# Patient Record
Sex: MALE | Race: WHITE | NOT HISPANIC OR LATINO | Employment: FULL TIME | URBAN - METROPOLITAN AREA
[De-identification: names, ages, dates, MRNs, and addresses within clinical notes are randomized per-mention and may not be internally consistent; named-entity substitution may affect disease eponyms.]

---

## 2022-09-15 ENCOUNTER — OFFICE VISIT (OUTPATIENT)
Dept: URGENT CARE | Age: 57
End: 2022-09-15
Payer: COMMERCIAL

## 2022-09-15 DIAGNOSIS — R05.9 COUGH: Primary | ICD-10-CM

## 2022-09-15 DIAGNOSIS — U07.1 COVID: ICD-10-CM

## 2022-09-15 LAB
SARS-COV-2 AG UPPER RESP QL IA: POSITIVE
VALID CONTROL: ABNORMAL

## 2022-09-15 PROCEDURE — 87811 SARS-COV-2 COVID19 W/OPTIC: CPT

## 2022-09-15 PROCEDURE — 99213 OFFICE O/P EST LOW 20 MIN: CPT

## 2022-09-15 RX ORDER — BENZONATATE 200 MG/1
200 CAPSULE ORAL 3 TIMES DAILY PRN
Qty: 20 CAPSULE | Refills: 0 | Status: SHIPPED | OUTPATIENT
Start: 2022-09-15

## 2022-09-15 NOTE — LETTER
September 15, 2022     Patient: Yung Hickman   YOB: 1965   Date of Visit: 9/15/2022       To Whom it May Concern:    Yung Hickman was seen in my clinic on 9/15/2022  He may return to work on 9/20/2022       If you have any questions or concerns, please don't hesitate to call           Sincerely,          FELIPE Simons      CC: No Recipients

## 2022-09-19 NOTE — PROGRESS NOTES
[facility]        NAME: Gayle Oviedo is a 61 y o  male  : 1965    MRN: [id number]  DATE: 2022  TIME: 3:14 PM    Assessment and Plan   Cough [R05 9]  1  Cough  Poct Covid 19 Rapid Antigen Test    benzonatate (TESSALON) 200 MG capsule   2  COVID          Patient presents with complaints of cough, congestion , PND that started 2 days ago  Denies fevers  Assessment notes adenopathy, congestion, max sinus tenderness  Clear breath sounds  POCT COVID positive  Discussed Paxlovid  Patient chooses to discuss with PCP  Will order tessalon    Patient Instructions       Follow up with PCP as needed    Chief Complaint     Chief Complaint   Patient presents with    Cough     Cough, HA, congestion and sinus pain Sx since yesterday  Son has Terra  Pt took home test yesterday which was negative  Pt is vaccinated and booster x 2  History of Present Illness          Patient presents with complaints of cough, congestion , PND that started 2 days ago  Denies fevers  Assessment notes adenopathy, congestion, max sinus tenderness  Clear breath sounds  POCT COVID positive  Discussed Paxlovid  Patient chooses to discuss with PCP  Will order tessalon    Cough  Associated symptoms include headaches, postnasal drip and a sore throat  Pertinent negatives include no chest pain, chills, ear pain, fever, myalgias, rash or shortness of breath  Review of Systems   Review of Systems   Constitutional: Negative for chills, fatigue and fever  HENT: Positive for congestion, postnasal drip, sinus pressure and sore throat  Negative for ear discharge, ear pain and sinus pain  Eyes: Negative for pain and discharge  Respiratory: Positive for cough  Negative for shortness of breath  Cardiovascular: Negative for chest pain and palpitations  Gastrointestinal: Negative for abdominal pain, diarrhea, nausea and vomiting  Genitourinary: Negative for difficulty urinating and dysuria  Musculoskeletal: Negative for arthralgias and myalgias  Skin: Negative for rash  Neurological: Positive for headaches  Negative for dizziness, syncope, light-headedness and numbness  Psychiatric/Behavioral: Negative for agitation  All other systems reviewed and are negative  Current Medications       Current Outpatient Medications:     amLODIPine (NORVASC) 10 mg tablet, Take 1 tablet (10 mg total) by mouth daily, Disp: 90 tablet, Rfl: 3    benzonatate (TESSALON) 200 MG capsule, Take 1 capsule (200 mg total) by mouth 3 (three) times a day as needed for cough, Disp: 20 capsule, Rfl: 0    triamcinolone (KENALOG) 0 1 % lotion, Apply topically 3 (three) times a day, Disp: 60 mL, Rfl: 0    Current Allergies     Allergies as of 09/15/2022    (No Known Allergies)            The following portions of the patient's history were reviewed and updated as appropriate: allergies, current medications, past family history, past medical history, past social history, past surgical history and problem list      No past medical history on file  No past surgical history on file  No family history on file  Medications have been verified  Objective   There were no vitals taken for this visit  No LMP for male patient  Physical Exam     Physical Exam  Vitals reviewed  Constitutional:       General: He is not in acute distress  Appearance: Normal appearance  He is not ill-appearing  HENT:      Head: Normocephalic and atraumatic  Nose: Congestion and rhinorrhea present  Mouth/Throat:      Pharynx: No oropharyngeal exudate or posterior oropharyngeal erythema  Eyes:      Extraocular Movements: Extraocular movements intact  Conjunctiva/sclera: Conjunctivae normal    Cardiovascular:      Rate and Rhythm: Normal rate and regular rhythm  Pulmonary:      Effort: Pulmonary effort is normal  No respiratory distress  Musculoskeletal:         General: Normal range of motion  Lymphadenopathy:      Cervical: Cervical adenopathy present  Skin:     General: Skin is warm  Neurological:      General: No focal deficit present  Mental Status: He is alert     Psychiatric:         Mood and Affect: Mood normal          Behavior: Behavior normal          Judgment: Judgment normal

## 2023-06-07 ENCOUNTER — APPOINTMENT (OUTPATIENT)
Dept: RADIOLOGY | Age: 58
End: 2023-06-07
Payer: OTHER MISCELLANEOUS

## 2023-06-07 ENCOUNTER — OCCMED (OUTPATIENT)
Dept: URGENT CARE | Age: 58
End: 2023-06-07
Payer: OTHER MISCELLANEOUS

## 2023-06-07 DIAGNOSIS — M25.561 ACUTE PAIN OF RIGHT KNEE: ICD-10-CM

## 2023-06-07 DIAGNOSIS — Z02.6 ENCOUNTER RELATED TO WORKER'S COMPENSATION CLAIM: Primary | ICD-10-CM

## 2023-06-07 PROCEDURE — 99213 OFFICE O/P EST LOW 20 MIN: CPT

## 2023-06-07 PROCEDURE — 99283 EMERGENCY DEPT VISIT LOW MDM: CPT

## 2023-06-07 PROCEDURE — G0382 LEV 3 HOSP TYPE B ED VISIT: HCPCS

## 2023-06-07 PROCEDURE — 73564 X-RAY EXAM KNEE 4 OR MORE: CPT

## 2023-06-12 ENCOUNTER — APPOINTMENT (OUTPATIENT)
Dept: URGENT CARE | Age: 58
End: 2023-06-12

## 2023-06-30 ENCOUNTER — APPOINTMENT (OUTPATIENT)
Dept: URGENT CARE | Age: 58
End: 2023-06-30
Payer: OTHER MISCELLANEOUS

## 2023-06-30 PROCEDURE — 99213 OFFICE O/P EST LOW 20 MIN: CPT

## 2023-07-10 VITALS
BODY MASS INDEX: 38.15 KG/M2 | SYSTOLIC BLOOD PRESSURE: 134 MMHG | WEIGHT: 306.8 LBS | DIASTOLIC BLOOD PRESSURE: 87 MMHG | HEART RATE: 81 BPM | HEIGHT: 75 IN

## 2023-07-10 DIAGNOSIS — M25.561 PAIN AND SWELLING OF RIGHT KNEE: ICD-10-CM

## 2023-07-10 DIAGNOSIS — M25.461 PAIN AND SWELLING OF RIGHT KNEE: ICD-10-CM

## 2023-07-10 DIAGNOSIS — S86.911A KNEE STRAIN, RIGHT, INITIAL ENCOUNTER: Primary | ICD-10-CM

## 2023-07-10 PROCEDURE — 99203 OFFICE O/P NEW LOW 30 MIN: CPT | Performed by: ORTHOPAEDIC SURGERY

## 2023-07-10 NOTE — PROGRESS NOTES
Ortho Sports Medicine Knee Visit     Assesment:   right knee mild OA, possible medial or lateral meniscal tears-poor quality MRI    Plan:    Conservative treatment:    Ice to knee for 20 minutes at least 1-2 times daily. PT for ROM/strengthening to knee, hip and core. OTC NSAIDS prn for pain. Discussed patient could have aggravated underlying arthritis, strained knee. If his symptoms persist in 6 weeks then will get higher quality MRI to further evaluate as current MRI is of low quality. Work note light duty restrictions. Imaging: All imaging from today was reviewed by myself and explained to the patient. Injection:    No Injection planned at this time. May consider CSI at next appt     Surgery:     No surgery is recommended at this point, continue with conservative treatment plan as noted. History of Present Illness: The patient is a 61 y.o. male whose occupation is [employer] referred to me by [facility], seen in clinic for consultation of right knee pain. Pain is located posterior, lateral, medial.   The pain has been present for 6 weeks. The patient sustained an injury on 6/5/23. Twisting injury to knee after stepping on chain at work. He did have swelling after injury with acute pain medial and lateral aspect of knee. He is wearing compressive knee sleeve for support. His knee will feel like a noodle with ambulation. The pain is characterized as sharp, dull, achy. The pain is present daily. Pain is improved by rest, NSAIDS and bracing. Pain is aggravated by stairs, weight bearing, walking, standing and pivoting on a planted foot. Symptoms include clicking, cracking and swelling. The patient has tried rest, ice, NSAIDS and bracing. Knee Surgical History:  None    Past Medical, Social and Family History:  No past medical history on file. No past surgical history on file.   No Known Allergies  Current Outpatient Medications on File Prior to Visit Medication Sig Dispense Refill   • amLODIPine (NORVASC) 10 mg tablet TAKE 1 TABLET DAILY 90 tablet 3   • benzonatate (TESSALON) 200 MG capsule Take 1 capsule (200 mg total) by mouth 3 (three) times a day as needed for cough (Patient not taking: Reported on 7/10/2023) 20 capsule 0   • triamcinolone (KENALOG) 0.1 % lotion Apply topically 3 (three) times a day (Patient not taking: Reported on 7/10/2023) 60 mL 0     No current facility-administered medications on file prior to visit. Social History     Socioeconomic History   • Marital status: /Civil Union     Spouse name: Not on file   • Number of children: Not on file   • Years of education: Not on file   • Highest education level: Not on file   Occupational History   • Not on file   Tobacco Use   • Smoking status: Former   • Smokeless tobacco: Former   Substance and Sexual Activity   • Alcohol use: Not on file   • Drug use: Not on file   • Sexual activity: Not on file   Other Topics Concern   • Not on file   Social History Narrative   • Not on file     Social Determinants of Health     Financial Resource Strain: Not on file   Food Insecurity: Not on file   Transportation Needs: Not on file   Physical Activity: Not on file   Stress: Not on file   Social Connections: Not on file   Intimate Partner Violence: Not on file   Housing Stability: Not on file         I have reviewed the past medical, surgical, social and family history, medications and allergies as documented in the EMR. Review of systems: ROS is negative other than that noted in the HPI. Constitutional: Negative for fatigue and fever. HENT: Negative for sore throat. Respiratory: Negative for shortness of breath. Cardiovascular: Negative for chest pain. Gastrointestinal: Negative for abdominal pain. Endocrine: Negative for cold intolerance and heat intolerance. Genitourinary: Negative for flank pain. Musculoskeletal: Negative for back pain. Skin: Negative for rash. Allergic/Immunologic: Negative for immunocompromised state. Neurological: Negative for dizziness. Psychiatric/Behavioral: Negative for agitation. Physical Exam:    Blood pressure 134/87, pulse 81, height 6' 3" (1.905 m), weight (!) 139 kg (306 lb 12.8 oz). General/Constitutional: NAD, well developed, well nourished  HENT: Normocephalic, atraumatic  CV: Intact distal pulses, regular rate  Resp: No respiratory distress or labored breathing  Lymphatic: No lymphadenopathy palpated  Neuro: Alert and Oriented x 3, no focal deficits  Psych: Normal mood, normal affect, normal judgement, normal behavior  Skin: Warm, dry, no rashes, no erythema       Knee Exam (focused):                  RIGHT LEFT   ROM:   0-120 0-130   Palpation: Effusion mild negative     MJL tenderness Positive Negative     LJL tenderness Positive Negative   Instability: Varus stable stable     Valgus stable stable   Special Tests: Lachman Negative Negative     Posterior drawer Negative Negative     Anterior drawer Negative Negative     Pivot shift not tested not tested     Dial not tested not tested   Patella: Palpation Medial and lateral facet no tenderness     Mobility 1/4 1/4     Apprehension Negative Negative   Other: Single leg 1/4 squat not tested not tested      LE NV Exam: +2 DP/PT pulses bilaterally  Sensation intact to light touch L2-S1 bilaterally     Bilateral hip ROM demonstrates no pain actively or passively    No calf tenderness to palpation bilaterally    Knee Imaging    X-rays of the right knee were reviewed, which demonstrate mild OA with large osgood schlatters fragment tibial tubercle with osteophytes  . I have reviewed the radiology report and agree with their impression.     MRI of the right knee were reviewed, which demonstrate loculated bakers cyst, no meniscal tears, no ligamentous tears, synovitis, osgood schlatters with large tibial tubercle ossicle  I have reviewed the radiology report and agree with their impression.       Scribe Attestation    I,:  Marylee Lock am acting as a scribe while in the presence of the attending physician.:       I,:  Rosangela Herring, DO personally performed the services described in this documentation    as scribed in my presence.:

## 2023-07-10 NOTE — LETTER
July 10, 2023     Patient: Celeste Hodges  YOB: 1965  Date of Visit: 7/10/2023      To Whom it May Concern:    Celeste Hodges is under my professional care. Alison Kohler was seen in my office on 7/10/2023. Alison Kohler will continue light duty work until next evaluation in 6 weeks, start physical therapy. If you have any questions or concerns, please don't hesitate to call.          Sincerely,          Lucille Inman DO        CC: No Recipients

## 2023-07-10 NOTE — LETTER
July 10, 2023     Dot Dunne MD  [address] [facility]    Patient: Miquel Pompa   YOB: 1965   Date of Visit: 7/10/2023       Dear Dr. Boateng Postin: Thank you for referring Miquel Pompa to me for evaluation. Below are my notes for this consultation. If you have questions, please do not hesitate to call me. I look forward to following your patient along with you.          Sincerely,        Kai Martin,         CC: No Recipients

## 2023-07-24 ENCOUNTER — EVALUATION (OUTPATIENT)
Dept: PHYSICAL THERAPY | Facility: MEDICAL CENTER | Age: 58
End: 2023-07-24
Payer: OTHER MISCELLANEOUS

## 2023-07-24 DIAGNOSIS — M25.461 PAIN AND SWELLING OF RIGHT KNEE: Primary | ICD-10-CM

## 2023-07-24 DIAGNOSIS — M25.561 PAIN AND SWELLING OF RIGHT KNEE: Primary | ICD-10-CM

## 2023-07-24 DIAGNOSIS — S86.911A KNEE STRAIN, RIGHT, INITIAL ENCOUNTER: ICD-10-CM

## 2023-07-24 PROCEDURE — 97161 PT EVAL LOW COMPLEX 20 MIN: CPT | Performed by: PHYSICAL THERAPIST

## 2023-07-24 PROCEDURE — 97110 THERAPEUTIC EXERCISES: CPT | Performed by: PHYSICAL THERAPIST

## 2023-07-24 NOTE — PROGRESS NOTES
PT Evaluation     Today's date: 2023  Patient name: Dung Kent  : 1965  MRN: [id number]  Referring provider: Suzan Salinas DO  Dx:   Encounter Diagnosis     ICD-10-CM    1. Pain and swelling of right knee  M25.561 Ambulatory Referral to Physical Therapy    M25.461       2. Knee strain, right, initial encounter  S86.911A Ambulatory Referral to Physical Therapy                     Assessment  Assessment details: Mr. Joseph Bennett is a pleasant 61 y.o. 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 Patient's greatest concern is returning to work full duty. No further referral appears necessary at this time based upon examination findings. Patient presents with primary movement impairment diagnosis of nociceptive right knee pain secondary to a valgus/varus injury of the right knee limiting his tolerance to walking, squatting, standing, and completing activities that require him to bend the knee. Patient would benefit from outpatient physical therapy services to address the observed impairments to restore patient to pre-injury level of functioning. Impairments: abnormal gait, abnormal muscle firing, abnormal or restricted ROM, activity intolerance, impaired physical strength, lacks appropriate home exercise program and pain with function  Barriers to therapy: BMI  Understanding of Dx/Px/POC: good   Prognosis: good  Prognosis details: Motivated to improve    Goals  1. Patient will be independent in individualized HEP. 2. Patient will report decreased pain to at most 2/10 with activity. 3. Patient will achieve right knee AROM 0-130 degrees and pain free for ADL/IADL performance. 4. Patient will improve right knee/hip strength to 5/5 to assist with squatting and stair climbing. 5. Patient will be able to return to work without restriction. 6. Patient will achieve score on FOTO by [facility].      Plan  Plan details: Please contact me with any questions. Thank you for the referral.   Patient would benefit from: skilled physical therapy  Planned therapy interventions: home exercise program, therapeutic activities, therapeutic exercise, strengthening, functional ROM exercises, graded activity, graded exercise, kinesiology taping, neuromuscular re-education and patient education  Frequency: 1-2x/week. Duration in weeks: 6  Plan of Care beginning date: 2023  Plan of Care expiration date: 2023  Treatment plan discussed with: patient        Subjective Evaluation    History of Present Illness  Date of onset: 2023  Mechanism of injury: Mr. Vinicio Espinal is a 61 y.o. male who presents today with reports of right knee pain. Patient reports he was unloading equipment from a truck when he stepped on a chain and his knee twisted inward and then out to the side. He reports he did have an x-ray and MRI. He states there were no significant findings on imaging other than swelling noted on the MRI. Ortho recommended bilateral knee compression sleeves. He states the braces feel good and give him more support and mobility. Patient denies any previous trauma to the right knee. Patient reports swelling that has improved. Patient reports one episode of the knee buckling since the initial injury. Patient states he feels good when in his pool. Patient reports he is relieved that his imaging came back without any findings of tears. Patient is still working but is on modified, light duty restrictions. He follows up with ortho again in about 1 month. His goals are to be able to return to work without restriction. Patient Goals  Patient goals for therapy: return to work    Pain  Current pain ratin  At best pain ratin  At worst pain ratin  Pain location: medial side of right knee, lateral and posterior knee occasionally.   Quality: pulling  Relieving factors: ice (bracing, TENs)  Aggravating factors: walking, standing and stair climbing (bending the knee when dressing)  Progression: no change    Social Support  Lives in: [facility] house    Employment status: working (light duty (, unloading of equipment))  Treatments  No previous or current treatments        Objective     Observations     Right Knee   Positive for effusion. Additional Observation Details  Prominent anterior tibial tubercles bilaterally  Swelling right anterior knee around the inferior patella and anterior tibial tubercle    Functional squat:  Sitting towards left side    STS:  Difficulty without use of hands, sitting towards left side    Tenderness     Right Knee   Tenderness in the inferior fat pad, lateral joint line, LCL (distal), LCL (proximal), MCL (distal), MCL (proximal), medial joint line, patellar tendon and tibial tubercle. Active Range of Motion   Left Knee   Flexion: 130 degrees   Extension: 0 degrees     Right Knee   Flexion: 115 degrees   Extension: 0 degrees     Passive Range of Motion     Right Knee   Flexion: 115 degrees with pain    Mobility   Patellar Mobility:     Right Knee   WFL: medial, lateral, superior and inferior    Strength/Myotome Testing     Left Knee   Normal strength  Quadriceps contraction: good    Right Knee   Flexion: 4  Extension: 3+  Right knee quadriceps contraction strength: fair-good. Additional Strength Details  Hip strength:  Flexion L = 5/5, R = 4/5  Abduction L = 4/5, R = 4-/5  Adduction L = 5/5, R = 4/5      Tests     Right Knee   Positive medial Katharine. Negative lateral Katharine, valgus stress test at 0 degrees, valgus stress test at 30 degrees, varus stress test at 0 degrees and varus stress test at 30 degrees.      Ambulation   Weight-Bearing Status   Weight-Bearing Status (Right): full weight-bearing    Assistive device used: none    Observational Gait   Gait: antalgic       Flowsheet Rows    Flowsheet Row Most Recent Value   PT/OT G-Codes    Current Score 38   Projected Score 59   FOTO information reviewed Yes   Assessment Type Evaluation             Precautions: HTN (medically controlled), hx of osgood schlotter's    HEP: quad sets, SLR: flexion, heel slides  Manuals 7/24            KT R knee Med, lat, ant  CK                                                   Neuro Re-Ed             SLS with hip abd/ext nv            SLS foam nv                                                                             Ther Ex             Quad set 5"x10            SLR: flexion x10            Heel slides x10            SLR: abduction nv            SLR: adduction nv            Anterior step down nv            Lateral step down nv            SL RDL             Ther Activity                                       Gait Training                                       Modalities

## 2023-08-03 ENCOUNTER — OFFICE VISIT (OUTPATIENT)
Dept: PHYSICAL THERAPY | Facility: MEDICAL CENTER | Age: 58
End: 2023-08-03
Payer: OTHER MISCELLANEOUS

## 2023-08-03 DIAGNOSIS — M25.461 PAIN AND SWELLING OF RIGHT KNEE: ICD-10-CM

## 2023-08-03 DIAGNOSIS — M25.561 PAIN AND SWELLING OF RIGHT KNEE: ICD-10-CM

## 2023-08-03 DIAGNOSIS — S86.911A KNEE STRAIN, RIGHT, INITIAL ENCOUNTER: Primary | ICD-10-CM

## 2023-08-03 PROCEDURE — 97110 THERAPEUTIC EXERCISES: CPT | Performed by: PHYSICAL THERAPIST

## 2023-08-03 PROCEDURE — 97112 NEUROMUSCULAR REEDUCATION: CPT | Performed by: PHYSICAL THERAPIST

## 2023-08-03 NOTE — PROGRESS NOTES
Daily Note     Today's date: 8/3/2023  Patient name: Miquel Pompa  : 1965  MRN: [id number]  Referring provider: Kai Martin DO  Dx:   Encounter Diagnosis     ICD-10-CM    1. Knee strain, right, initial encounter  S86.911A       2. Pain and swelling of right knee  M25.561     M25.461                      Subjective: Patient reports decreased pain and swelling in the right knee. He reports compliance with HEP. He noted relief with KT and states it lasted for 2 days. Objective: See treatment diary below      Assessment: Patient presents with improved quad activation and improved knee ROM. He has right knee AROM 0-120 degrees. He demonstrates good quad control with SLR and was able to progress to SLR in various planes. Initiated SL balance with good tolerance. Slight increase in medial knee discomfort with SL balance into forward, lateral, and reverse taps. He did require cueing for valgus control and eccentric lowering that may have contributed to increased medial knee stress. Updated HEP in which patient demonstrated and verbalized understanding. Patient would benefit from continued PT for graded strengthening and stabilization to facilitate return to PLOF. Plan: Continue per plan of care. Progress treatment as tolerated.        Precautions: HTN (medically controlled), hx of osgood schlotter's    HEP: quad sets, SLR: flexion, heel slides, SL balance, SL balance + clock taps (no adduction  Manuals 7/24 8/3           KT R knee Med, lat, ant  CK CK                                                  Neuro Re-Ed             SLS with hip abd/ext nv            SLS foam nv 30"x3           SLS with forward, lateral, reverse taps  x10 rounds                                                               Ther Ex             Quad set 5"x10 5"x30           SLR: flexion x10 x30           Heel slides x10 x30           SLR: abduction nv x30           SLR: adduction nv x30           Anterior step down nv nv Lateral step down nv nv           SL RDL  nv           Ther Activity                                       Gait Training                                       Modalities

## 2023-08-10 ENCOUNTER — OFFICE VISIT (OUTPATIENT)
Dept: PHYSICAL THERAPY | Facility: MEDICAL CENTER | Age: 58
End: 2023-08-10
Payer: OTHER MISCELLANEOUS

## 2023-08-10 DIAGNOSIS — S86.911A KNEE STRAIN, RIGHT, INITIAL ENCOUNTER: Primary | ICD-10-CM

## 2023-08-10 DIAGNOSIS — M25.461 PAIN AND SWELLING OF RIGHT KNEE: ICD-10-CM

## 2023-08-10 DIAGNOSIS — M25.561 PAIN AND SWELLING OF RIGHT KNEE: ICD-10-CM

## 2023-08-10 PROCEDURE — 97110 THERAPEUTIC EXERCISES: CPT | Performed by: PHYSICAL THERAPIST

## 2023-08-10 PROCEDURE — 97112 NEUROMUSCULAR REEDUCATION: CPT | Performed by: PHYSICAL THERAPIST

## 2023-08-10 NOTE — PROGRESS NOTES
Daily Note     Today's date: 8/10/2023  Patient name: Christopher Cuadra  : 1965  MRN: [id number]  Referring provider: Paulino Allen DO  Dx:   Encounter Diagnosis     ICD-10-CM    1. Knee strain, right, initial encounter  S86.911A       2. Pain and swelling of right knee  M25.561     M25.461                      Subjective: Patient reports mild soreness at the medial knee after last visit. He states it did not limit him any more functionally. He reports only mild right medial knee pain. Objective: See treatment diary below      Assessment: Patient presents with improved quad activation and knee AROM WNL. He continues to be challenged with single limb stability with increased hip and trunk sway with reports of mild medial knee discomfort. Worked on straight plane step downs (forward and lateral) with cueing for valgus control without medial knee pain. Progressed posterior chain strengthening with external support for balance and cueing for hip hinge. Patient reported feeling good post treatment session. Progressed HEP to include standing balance with hip ext/abd to home program. Patient declined KT today. Patient will benefit from continued PT for graded strengthening and balance training to reduce right knee pain and facilitate return to PLOF. Plan: Continue per plan of care. Progress treatment as tolerated.        Precautions: HTN (medically controlled), hx of osgood schlotter's    HEP: quad sets, SLR: flexion, heel slides, SL balance, SL balance + clock taps (no adduction  Manuals 7/24 8/3 8/10          KT R knee Med, lat, ant  CK CK np                                                 Neuro Re-Ed             SLS with hip abd/ext nv  YTB x30          SLS foam nv 30"x3 30"x3          SLS with forward, lateral, reverse taps  x10 rounds x10 rounds 4"                                                              Ther Ex             bike   x10'          Quad set 5"x10 5"x30 5"x30          SLR: flexion x10 x30 1# x30          Heel slides x10 x30 np          SLR: abduction nv x30 1# x30          SLR: adduction nv x30 1# x30          Anterior step down nv nv 4" 3x10 B          Lateral step down nv nv 4" 3x10 B          SL RDL  nv 3x10 B          Ther Activity                                       Gait Training                                       Modalities

## 2023-08-15 ENCOUNTER — OFFICE VISIT (OUTPATIENT)
Dept: PHYSICAL THERAPY | Facility: MEDICAL CENTER | Age: 58
End: 2023-08-15
Payer: OTHER MISCELLANEOUS

## 2023-08-15 DIAGNOSIS — M25.461 PAIN AND SWELLING OF RIGHT KNEE: ICD-10-CM

## 2023-08-15 DIAGNOSIS — S86.911A KNEE STRAIN, RIGHT, INITIAL ENCOUNTER: Primary | ICD-10-CM

## 2023-08-15 DIAGNOSIS — M25.561 PAIN AND SWELLING OF RIGHT KNEE: ICD-10-CM

## 2023-08-15 PROCEDURE — 97110 THERAPEUTIC EXERCISES: CPT | Performed by: PHYSICAL THERAPIST

## 2023-08-15 PROCEDURE — 97112 NEUROMUSCULAR REEDUCATION: CPT | Performed by: PHYSICAL THERAPIST

## 2023-08-15 NOTE — PROGRESS NOTES
Daily Note     Today's date: 8/15/2023  Patient name: Eloisa Jacobo  : 1965  MRN: [id number]  Referring provider: Dareen Felty, DO  Dx:   Encounter Diagnosis     ICD-10-CM    1. Knee strain, right, initial encounter  S86.911A       2. Pain and swelling of right knee  M25.561     M25.461                      Subjective: Patient states he is having some increased right medial knee pain. He reports he may have overdone it with the exercises. He states from last visit however he felt like he was making steady improvement. Objective: See treatment diary below      Assessment: Patient presents with increased symptom irritability today. Improved with controlled AROM on bike. Patient fatigued with SLR today and required cueing to avoid quad lag as muscular fatigue settled in. Able to continue with CKC stabilization and strengthening as soreness that patient came in with worked itself out. Patient demonstrated improved balance and neuromuscular control with single limb activities. KT applied post treatment session for pain modulation. Discussed modifying exercises as needed if sore. Patient will benefit from continued PT for graded strengthening and balance training to reduce right knee pain and facilitate return to PLOF. Plan: Continue per plan of care. Progress treatment as tolerated. progress pending follow up with ortho .       Precautions: HTN (medically controlled), hx of osgood schlotter's    HEP: quad sets, SLR: flexion, heel slides, SL balance, SL balance + clock taps (no adduction  Manuals 7/24 8/3 8/10 8/15         KT R knee Med, lat, ant  CK CK np CK                                                Neuro Re-Ed             SLS with hip abd/ext nv  YTB x30 nv         SLS foam nv 30"x3 30"x3 30"x3         SLS with forward, lateral, reverse taps  x10 rounds x10 rounds 4" 4" x10 rounds                                                             Ther Ex             bike   x10' x10' Quad set 5"x10 5"x30 5"x30 5x30         SLR: flexion x10 x30 1# x30 1# x30         Heel slides x10 x30 np          SLR: abduction nv x30 1# x30 1# x30         SLR: adduction nv x30 1# x30 1# x30         Anterior step down nv nv 4" 3x10 B 4" 3x10 B         Lateral step down nv nv 4" 3x10 B 4" 3x10         SL RDL  nv 3x10 B 3x10 B         Ther Activity                                       Gait Training                                       Modalities

## 2023-08-23 ENCOUNTER — OFFICE VISIT (OUTPATIENT)
Dept: OBGYN CLINIC | Facility: MEDICAL CENTER | Age: 58
End: 2023-08-23
Payer: OTHER MISCELLANEOUS

## 2023-08-23 ENCOUNTER — TELEPHONE (OUTPATIENT)
Age: 58
End: 2023-08-23

## 2023-08-23 VITALS
HEART RATE: 83 BPM | SYSTOLIC BLOOD PRESSURE: 131 MMHG | WEIGHT: 305.2 LBS | BODY MASS INDEX: 37.95 KG/M2 | HEIGHT: 75 IN | DIASTOLIC BLOOD PRESSURE: 83 MMHG

## 2023-08-23 DIAGNOSIS — M23.91 INTERNAL DERANGEMENT OF KNEE JOINT, RIGHT: Primary | ICD-10-CM

## 2023-08-23 PROCEDURE — 99213 OFFICE O/P EST LOW 20 MIN: CPT | Performed by: ORTHOPAEDIC SURGERY

## 2023-08-23 RX ORDER — IBUPROFEN 100 MG/1
100 TABLET, CHEWABLE ORAL EVERY 8 HOURS PRN
COMMUNITY

## 2023-08-23 NOTE — TELEPHONE ENCOUNTER
Caller: Tosin Harry     Doctor: Samantha Khoury    Reason for call: His work does not have an incoming fax number, Can you place note into his mychart? He is currently settting up his account online.     Call back#: [phone]

## 2023-08-23 NOTE — PROGRESS NOTES
Ortho Sports Medicine Knee Follow Up Visit     Assesment:     61 y.o. male right knee mild OA with possible medial meniscal tear-poor quality MRI received previously     Plan:    Conservative treatment:    Ice to knee for 20 minutes at least 1-2 times daily. PT for ROM/strengthening to knee, hip and core. OTC NSAIDS prn for pain. Imaging: All imaging from today was reviewed by myself and explained to the patient. We will obtain an MRI of the knee to rule out medial mensicus tear . Follow up in 1-2 weeks for MRI review. Will make a definitive treatment plan based on the results of the MRI. Injection:    No Injection planned at this time. Surgery:     No surgery is recommended at this point, continue with conservative treatment plan as noted. Follow up:    Return for 1 week after MRI to review results . Chief Complaint   Patient presents with   • Right Knee - Follow-up       History of Present Illness: The patient is returns for follow up of Right knee pain. Since the prior visit, He reports no improvement. Patient has been attending physical therapy for his right knee since his last appointment. Patient notes very minimal improvement with this. Patient states that he continues to have pain about the medial aspect of the knee. Patient reports that the pain is constant stating it is minimal at rest but becomes worse with weightbearing activities and then increasingly worse if he were to do any twisting pivoting type motions. Patient has been using his knee sleeve to provide him with some stability. Patient does report that with lateral movements or any twisting motion he does feel as though the knee is unstable and is going to give out and buckle. Patient denies any new injuries or trauma since last appointment. Patient does notice nonpainful clicking and catching within the knee since her last appointment.     Pain is located medial.     Pain is improved by rest.  Pain is aggravated by stairs, squatting, weight bearing, walking, standing and pivoting on a planted foot. Symptoms include clicking, catching, swelling and instability. The patient has tried rest, ice, NSAIDS, physical therapy and bracing. Knee Surgical History:  None    Past Medical, Social and Family History:  History reviewed. No pertinent past medical history. History reviewed. No pertinent surgical history. No Known Allergies  Current Outpatient Medications on File Prior to Visit   Medication Sig Dispense Refill   • amLODIPine (NORVASC) 10 mg tablet TAKE 1 TABLET DAILY 90 tablet 3   • ibuprofen (ADVIL,MOTRIN) 100 MG chewable tablet Chew 100 mg every 8 (eight) hours as needed for mild pain     • benzonatate (TESSALON) 200 MG capsule Take 1 capsule (200 mg total) by mouth 3 (three) times a day as needed for cough (Patient not taking: Reported on 7/10/2023) 20 capsule 0   • triamcinolone (KENALOG) 0.1 % lotion Apply topically 3 (three) times a day (Patient not taking: Reported on 7/10/2023) 60 mL 0     No current facility-administered medications on file prior to visit.      Social History     Socioeconomic History   • Marital status: /Civil Union     Spouse name: Not on file   • Number of children: Not on file   • Years of education: Not on file   • Highest education level: Not on file   Occupational History   • Not on file   Tobacco Use   • Smoking status: Former   • Smokeless tobacco: Former   Substance and Sexual Activity   • Alcohol use: Not on file   • Drug use: Not on file   • Sexual activity: Not on file   Other Topics Concern   • Not on file   Social History Narrative   • Not on file     Social Determinants of Health     Financial Resource Strain: Not on file   Food Insecurity: Not on file   Transportation Needs: Not on file   Physical Activity: Not on file   Stress: Not on file   Social Connections: Not on file   Intimate Partner Violence: Not on file   Housing Stability: Not on file I have reviewed the past medical, surgical, social and family history, medications and allergies as documented in the EMR. Review of systems: ROS is negative other than that noted in the HPI. Constitutional: Negative for fatigue and fever. Physical Exam:    Blood pressure 131/83, pulse 83, height 6' 3" (1.905 m), weight (!) 138 kg (305 lb 3.2 oz). General/Constitutional: NAD, well developed, well nourished  HENT: Normocephalic, atraumatic  CV: Intact distal pulses, regular rate  Resp: No respiratory distress or labored breathing  GI: Soft and non-tender   Lymphatic: No lymphadenopathy palpated  Neuro: Alert and Oriented x 3, no focal deficits  Psych: Normal mood, normal affect, normal judgement, normal behavior  Skin: Warm, dry, no rashes, no erythema      Knee Exam (focused): RIGHT LEFT   ROM:   0-130, pain medially with hyperflexion 0-130   Palpation: Effusion minimal negative     MJL tenderness Positive Negative     LJL tenderness Negative Negative   Meniscus:  Katharine Positive Negative    Apley's Compression Positive Negative   Instability: Varus stable stable     Valgus Stable, pain medially with stress stable   Special Tests: Lachman Negative Negative     Posterior drawer Negative Negative     Anterior drawer Negative Negative     Pivot shift not tested not tested     Dial not tested not tested   Patella: Palpation no tenderness no tenderness     Mobility 1/4 1/4     Apprehension Negative Negative   Other: Single leg 1/4 squat not tested not tested           LE NV Exam: +2 DP/PT pulses bilaterally  Sensation intact to light touch L2-S1 bilaterally    No calf tenderness to palpation bilaterally      Knee Imaging    No imaging was performed today

## 2023-08-23 NOTE — TELEPHONE ENCOUNTER
Caller: patient    Doctor: Gilda Dasilva    Reason for call: patient requesting letter for work to be faxed.  Will cb with fax #    Call back#: [phone]

## 2023-08-24 ENCOUNTER — TELEPHONE (OUTPATIENT)
Dept: OBGYN CLINIC | Facility: MEDICAL CENTER | Age: 58
End: 2023-08-24

## 2023-08-24 ENCOUNTER — OFFICE VISIT (OUTPATIENT)
Dept: PHYSICAL THERAPY | Facility: MEDICAL CENTER | Age: 58
End: 2023-08-24
Payer: OTHER MISCELLANEOUS

## 2023-08-24 DIAGNOSIS — S86.911A KNEE STRAIN, RIGHT, INITIAL ENCOUNTER: Primary | ICD-10-CM

## 2023-08-24 DIAGNOSIS — M25.561 PAIN AND SWELLING OF RIGHT KNEE: ICD-10-CM

## 2023-08-24 DIAGNOSIS — M25.461 PAIN AND SWELLING OF RIGHT KNEE: ICD-10-CM

## 2023-08-24 PROCEDURE — 97110 THERAPEUTIC EXERCISES: CPT | Performed by: PHYSICAL THERAPIST

## 2023-08-24 PROCEDURE — 97112 NEUROMUSCULAR REEDUCATION: CPT | Performed by: PHYSICAL THERAPIST

## 2023-08-24 PROCEDURE — 97140 MANUAL THERAPY 1/> REGIONS: CPT | Performed by: PHYSICAL THERAPIST

## 2023-08-24 NOTE — TELEPHONE ENCOUNTER
Anaya Bowers was in for an appt with you yesterday and didn't think he needed a letter but workers comp is requesting one now.   Can you please do a letter for him and I will email it to him

## 2023-08-24 NOTE — PROGRESS NOTES
Daily Note     Today's date: 2023  Patient name: Guero Rondon  : 1965  MRN: [id number]  Referring provider: Orly Schumacher DO  Dx:   Encounter Diagnosis     ICD-10-CM    1. Knee strain, right, initial encounter  S86.911A       2. Pain and swelling of right knee  M25.561     M25.461                      Subjective: Patient followed up with Dr. Denisse Nesbitt. He is being sent for a second MRI as his initial MRI was unclear. His MRI is scheduled for . Patient states he is still experiencing anterior medial knee pain. He states the lateral and posterior pain has resolved. Objective: See treatment diary below      Assessment: Patient continues to have discomfort medially and anteriorly at the left knee. Improved with medial knee and patellar ROM and mobilization. Decreased pain reported with weightbearing and ambulation following. Demonstrates good quad set and improved stability and control at the left knee with SL CKC strengthening. Patient will benefit from continued PT for graded strengthening and balance training to reduce right knee pain and facilitate return to PLOF. Plan: Continue per plan of care. Progress treatment as tolerated. progress pending follow up with ortho .       Precautions: HTN (medically controlled), hx of osgood schlotter's    HEP: quad sets, SLR: flexion, abduction, adduction, heel slides, SL balance, SL balance + clock taps (no adduction)  Manuals  8/3 8/10 8/15 8/24        KT R knee Med, lat, ant  CK CK np CK         STM medial knee, inferior patella, patellar ROM     x8'                                  Neuro Re-Ed             SLS with hip abd/ext nv  YTB x30 nv YTB x30        SLS foam nv 30"x3 30"x3 30"x3 30" x3        SLS with forward, lateral, reverse taps  x10 rounds x10 rounds 4" 4" x10 rounds cones x10 rounds                                                            Ther Ex             bike   x10' x10' x10'        Quad set 5"x10 5"x30 5"x30 5x30 5"x30        SLR: flexion x10 x30 1# x30 1# x30 1# x30        Heel slides x10 x30 np  x30        SLR: abduction nv x30 1# x30 1# x30 1# x30        SLR: adduction nv x30 1# x30 1# x30 1# x30        Anterior step down nv nv 4" 3x10 B 4" 3x10 B 4" 3x10         Lateral step down nv nv 4" 3x10 B 4" 3x10 4" 3x10        SL RDL  nv 3x10 B 3x10 B 3x10         Ther Activity                                       Gait Training                                       Modalities

## 2023-08-31 ENCOUNTER — OFFICE VISIT (OUTPATIENT)
Dept: PHYSICAL THERAPY | Facility: MEDICAL CENTER | Age: 58
End: 2023-08-31
Payer: OTHER MISCELLANEOUS

## 2023-08-31 DIAGNOSIS — M25.561 PAIN AND SWELLING OF RIGHT KNEE: ICD-10-CM

## 2023-08-31 DIAGNOSIS — M25.461 PAIN AND SWELLING OF RIGHT KNEE: ICD-10-CM

## 2023-08-31 DIAGNOSIS — S86.911A KNEE STRAIN, RIGHT, INITIAL ENCOUNTER: Primary | ICD-10-CM

## 2023-08-31 PROCEDURE — 97110 THERAPEUTIC EXERCISES: CPT | Performed by: PHYSICAL THERAPIST

## 2023-08-31 PROCEDURE — 97140 MANUAL THERAPY 1/> REGIONS: CPT | Performed by: PHYSICAL THERAPIST

## 2023-08-31 PROCEDURE — 97112 NEUROMUSCULAR REEDUCATION: CPT | Performed by: PHYSICAL THERAPIST

## 2023-08-31 NOTE — PROGRESS NOTES
Daily Note     Today's date: 2023  Patient name: Steven Nash  : 1965  MRN: [id number]  Referring provider: Lidia Ascencio DO  Dx:   Encounter Diagnosis     ICD-10-CM    1. Knee strain, right, initial encounter  S86.911A       2. Pain and swelling of right knee  M25.561     M25.461                      Subjective: Patient states he continues to have anterior medial knee pain but states it is not worse. Objective: See treatment diary below      Assessment: Patient is ambulating better and tolerates functional strengthening well. He does unfortunately still experience anterior knee pain, which I do believe is patellofemoral in origin. Anterior step down was transitioned to decline board to increase load on patellar tendon. Patient demonstrates good understanding of all interventions with good valgus control at the knee. Patient reported quad and calf muscle soreness post treatment session. Patient will benefit from continued PT for graded strengthening and balance training to reduce right knee pain and facilitate return to PLOF. Plan: Continue per plan of care. Progress treatment as tolerated. progress pending follow up with ortho .       Precautions: HTN (medically controlled), hx of osgood schlotter's    HEP: quad sets, SLR: flexion, abduction, adduction, heel slides, SL balance, SL balance + clock taps (no adduction)  Manuals 7/24 8/3 8/10 8/15 8/24 8/31       KT R knee Med, lat, ant  CK CK np CK         STM medial knee, inferior patella, patellar ROM     x8' x8'                                 Neuro Re-Ed             SLS with hip abd/ext nv  YTB x30 nv YTB x30 YTB x30       SLS foam nv 30"x3 30"x3 30"x3 30" x3 30"x3       SLS with forward, lateral, reverse taps  x10 rounds x10 rounds 4" 4" x10 rounds cones x10 rounds cones x10 rounds                                                           Ther Ex             bike   x10' x10' x10' x10'       Quad set 5"x10 5"x30 5"x30 5x30 5"x30 5"x30       SLR: flexion x10 x30 1# x30 1# x30 1# x30 2# x30       Heel slides x10 x30 np  x30        SLR: abduction nv x30 1# x30 1# x30 1# x30 2# x30       SLR: adduction nv x30 1# x30 1# x30 1# x30 2# x30       Anterior step down nv nv 4" 3x10 B 4" 3x10 B 4" 3x10  decline board 3x10       Lateral step down nv nv 4" 3x10 B 4" 3x10 4" 3x10 6" 3x10       SL RDL  nv 3x10 B 3x10 B 3x10  3x10       Ther Activity                                       Gait Training                                       Modalities

## 2023-09-01 ENCOUNTER — HOSPITAL ENCOUNTER (OUTPATIENT)
Facility: MEDICAL CENTER | Age: 58
Discharge: HOME | End: 2023-09-01
Payer: COMMERCIAL

## 2023-09-01 DIAGNOSIS — M23.91 INTERNAL DERANGEMENT OF KNEE JOINT, RIGHT: ICD-10-CM

## 2023-09-01 PROCEDURE — 73721 MRI JNT OF LWR EXTRE W/O DYE: CPT

## 2023-09-01 PROCEDURE — G1004 CDSM NDSC: HCPCS

## 2023-09-06 ENCOUNTER — TELEPHONE (OUTPATIENT)
Age: 58
End: 2023-09-06

## 2023-09-06 ENCOUNTER — OFFICE VISIT (OUTPATIENT)
Dept: PHYSICAL THERAPY | Facility: MEDICAL CENTER | Age: 58
End: 2023-09-06
Payer: OTHER MISCELLANEOUS

## 2023-09-06 DIAGNOSIS — M25.461 PAIN AND SWELLING OF RIGHT KNEE: ICD-10-CM

## 2023-09-06 DIAGNOSIS — S86.911A KNEE STRAIN, RIGHT, INITIAL ENCOUNTER: Primary | ICD-10-CM

## 2023-09-06 DIAGNOSIS — M25.561 PAIN AND SWELLING OF RIGHT KNEE: ICD-10-CM

## 2023-09-06 PROCEDURE — 97112 NEUROMUSCULAR REEDUCATION: CPT | Performed by: PHYSICAL THERAPIST

## 2023-09-06 PROCEDURE — 97110 THERAPEUTIC EXERCISES: CPT | Performed by: PHYSICAL THERAPIST

## 2023-09-06 NOTE — TELEPHONE ENCOUNTER
Doctor: Kary Lozano Name: [facility]  CB#:     [facility] called to speak to clinical team regarding MRI ordered by provider.

## 2023-09-06 NOTE — TELEPHONE ENCOUNTER
Left message to please call the office. When Pt returns call please state providers message and schedule appointment with Dr. Gayathri Beckham.

## 2023-09-06 NOTE — PROGRESS NOTES
Daily Note     Today's date: 2023  Patient name: Angela Reynolds  : 1965  MRN: [id number]  Referring provider: Twila Verdugo DO  Dx:   Encounter Diagnosis     ICD-10-CM    1. Knee strain, right, initial encounter  S86.911A       2. Pain and swelling of right knee  M25.561     M25.461                      Subjective: Patient follows up with ortho on Friday to review his MRI results. He states he feels his right knee is slowly feeling better. He denies any symptom provocation with physical therapy interventions but does admit to muscular soreness. Objective: See treatment diary below      Assessment: Patient is ambulating better and tolerates functional strengthening well. Patient is responding well to current treatment plan. He demonstrates improved motor control and knee proprioception and has been able to progress balance interventions to increase challenge. Patient denied any symptom provocation during or post treatment session. Patient will follow up after ortho appointment to discuss POC going forward. Plan: Pending ortho visit.       Precautions: HTN (medically controlled), hx of osgood schlotter's    HEP: quad sets, SLR: flexion, abduction, adduction, heel slides, SL balance, SL balance + clock taps (no adduction)  Manuals 7/24 8/3 8/10 8/15 8/24 8/31 9/6      KT R knee Med, lat, ant  CK CK np CK         STM medial knee, inferior patella, patellar ROM     x8' x8'                                 Neuro Re-Ed             SLS with hip abd/ext nv  YTB x30 nv YTB x30 YTB x30 YTB x30      SLS foam nv 30"x3 30"x3 30"x3 30" x3 30"x3 ABC's x3      SLS with forward, lateral, reverse taps  x10 rounds x10 rounds 4" 4" x10 rounds cones x10 rounds cones x10 rounds cones x10 rounds                                                          Ther Ex             bike   x10' x10' x10' x10' x10'      Quad set 5"x10 5"x30 5"x30 5x30 5"x30 5"x30 5'x30      SLR: flexion x10 x30 1# x30 1# x30 1# x30 2# x30 2# x30      Heel slides x10 x30 np  x30        SLR: abduction nv x30 1# x30 1# x30 1# x30 2# x30 2# 3x30      SLR: adduction nv x30 1# x30 1# x30 1# x30 2# x30 2# x30      Anterior step down nv nv 4" 3x10 B 4" 3x10 B 4" 3x10  decline board 3x10 decline board 3x10      Lateral step down nv nv 4" 3x10 B 4" 3x10 4" 3x10 6" 3x10 6" 3x10      SL RDL  nv 3x10 B 3x10 B 3x10  3x10 3x10      Ther Activity                                       Gait Training                                       Modalities

## 2023-09-07 NOTE — TELEPHONE ENCOUNTER
Caller: Walter Plummer     Doctor: Dr Nino Kothari     Reason for call: Returning your call     Call back#: [phone] Please call this number.

## 2023-09-08 ENCOUNTER — OFFICE VISIT (OUTPATIENT)
Dept: OBGYN CLINIC | Facility: MEDICAL CENTER | Age: 58
End: 2023-09-08
Payer: OTHER MISCELLANEOUS

## 2023-09-08 VITALS
BODY MASS INDEX: 38.05 KG/M2 | HEIGHT: 75 IN | SYSTOLIC BLOOD PRESSURE: 134 MMHG | DIASTOLIC BLOOD PRESSURE: 86 MMHG | HEART RATE: 83 BPM | WEIGHT: 306 LBS

## 2023-09-08 DIAGNOSIS — R22.41 MASS OF RIGHT KNEE: Primary | ICD-10-CM

## 2023-09-08 PROCEDURE — 99213 OFFICE O/P EST LOW 20 MIN: CPT | Performed by: ORTHOPAEDIC SURGERY

## 2023-09-08 NOTE — PROGRESS NOTES
Ortho Sports Medicine Knee Follow Up Visit     Assesment:     61 y.o. 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 9x1.6CM, differential considerations localized nodular synovitis, synovial sarcoma. Plan:    Conservative treatment:    Ice to knee for 20 minutes at least 1-2 times daily. OTC NSAIDS prn for pain. Patient will be referred to Dr Jared Perez to see if this synovial tumor is related to pain medial aspect of knee and further evaluate to make sure mass is not concerning. MRI shows no medial meniscal tear or ligamentous tears. Maintain HEP as shown by therapy. Imaging: All imaging from today was reviewed by myself and explained to the patient. Injection:    No Injection planned at this time. Surgery:     No surgery is recommended at this point, continue with conservative treatment plan as noted. Follow up:    Return for Dr Jared Perez . Chief Complaint   Patient presents with   • Right Knee - Follow-up       History of Present Illness: The patient is returns for follow up of Right knee pain. He is here to review updated MRI of right knee. Since the prior visit, He reports no improvement. Patient has been attending physical therapy for his right knee since his last appointment. Patient notes very minimal improvement with this. Patient states that he continues to have pain about the medial aspect of the knee. Patient reports that the pain is constant stating it is minimal at rest but becomes worse with weightbearing activities and then increasingly worse if he were to do any twisting pivoting type motions. Patient has been using his knee sleeve to provide him with some stability. Patient does report that with lateral movements or any twisting motion he does feel as though the knee is unstable and is going to give out and buckle.   Patient denies any new injuries or trauma since last appointment. Patient does notice nonpainful clicking and catching within the knee since her last appointment. Pain is improved by rest.  Pain is aggravated by stairs, squatting, weight bearing, walking, standing and pivoting on a planted foot. Symptoms include clicking, catching, swelling and instability. The patient has tried rest, ice, NSAIDS, physical therapy and bracing. Knee Surgical History:  None    Past Medical, Social and Family History:  No past medical history on file. No past surgical history on file. No Known Allergies  Current Outpatient Medications on File Prior to Visit   Medication Sig Dispense Refill   • amLODIPine (NORVASC) 10 mg tablet TAKE 1 TABLET DAILY 90 tablet 3   • ibuprofen (ADVIL,MOTRIN) 100 MG chewable tablet Chew 100 mg every 8 (eight) hours as needed for mild pain     • benzonatate (TESSALON) 200 MG capsule Take 1 capsule (200 mg total) by mouth 3 (three) times a day as needed for cough (Patient not taking: Reported on 7/10/2023) 20 capsule 0   • triamcinolone (KENALOG) 0.1 % lotion Apply topically 3 (three) times a day (Patient not taking: Reported on 7/10/2023) 60 mL 0     No current facility-administered medications on file prior to visit.      Social History     Socioeconomic History   • Marital status: /Civil Union     Spouse name: Not on file   • Number of children: Not on file   • Years of education: Not on file   • Highest education level: Not on file   Occupational History   • Not on file   Tobacco Use   • Smoking status: Former   • Smokeless tobacco: Former   Substance and Sexual Activity   • Alcohol use: Not on file   • Drug use: Not on file   • Sexual activity: Not on file   Other Topics Concern   • Not on file   Social History Narrative   • Not on file     Social Determinants of Health     Financial Resource Strain: Not on file   Food Insecurity: Not on file   Transportation Needs: Not on file   Physical Activity: Not on file   Stress: Not on file   Social Connections: Not on file   Intimate Partner Violence: Not on file   Housing Stability: Not on file         I have reviewed the past medical, surgical, social and family history, medications and allergies as documented in the EMR. Review of systems: ROS is negative other than that noted in the HPI. Constitutional: Negative for fatigue and fever. Physical Exam:    Blood pressure 134/86, pulse 83, height 6' 3" (1.905 m), weight (!) 139 kg (306 lb). General/Constitutional: NAD, well developed, well nourished  HENT: Normocephalic, atraumatic  CV: Intact distal pulses, regular rate  Resp: No respiratory distress or labored breathing  GI: Soft and non-tender   Lymphatic: No lymphadenopathy palpated  Neuro: Alert and Oriented x 3, no focal deficits  Psych: Normal mood, normal affect, normal judgement, normal behavior  Skin: Warm, dry, no rashes, no erythema      Knee Exam (focused): RIGHT LEFT   ROM:   0-130, pain medially with hyperflexion 0-130   Palpation: Effusion minimal negative     MJL tenderness Positive Negative     LJL tenderness Negative Negative   Meniscus:  Katharine Positive Negative    Apley's Compression Positive Negative   Instability: Varus stable stable     Valgus Stable, pain medially with stress stable   Special Tests: Lachman Negative Negative     Posterior drawer Negative Negative     Anterior drawer Negative Negative     Pivot shift not tested not tested     Dial not tested not tested   Patella: Palpation no tenderness no tenderness     Mobility 1/4 1/4     Apprehension Negative Negative   Other: Single leg 1/4 squat not tested not tested           LE NV Exam: +2 DP/PT pulses bilaterally  Sensation intact to light touch L2-S1 bilaterally    No calf tenderness to palpation bilaterally      Knee Imaging    MRI right knee no meniscal or ligamentous tears, trace effusion, lobulated intra articular mass posterior intercondylar notch, posterior and medial to PCL measuring 3.4x1. 9x1.6CM, differential considerations localized nodular synovitis, synovial sarcoma.      Scribe Attestation    I,:  Stuart Davis am acting as a scribe while in the presence of the attending physician.:       I,:  Josué Ramos, DO personally performed the services described in this documentation    as scribed in my presence.:

## 2023-09-08 NOTE — LETTER
September 8, 2023     Patient: Matias Gomes  YOB: 1965  Date of Visit: 9/8/2023      To Whom it May Concern:    Matias Gomes is under my professional care. Nkihil Priest was seen in my office on 9/8/2023. Nikhil Priest will continue with light duty restrictions. He will see Dr Jared Perez 9/14/23 to further evaluate right knee. If you have any questions or concerns, please don't hesitate to call.          Sincerely,          Dmitriy Osuna,         CC: No Recipients

## 2023-09-14 ENCOUNTER — OFFICE VISIT (OUTPATIENT)
Dept: OBGYN CLINIC | Facility: MEDICAL CENTER | Age: 58
End: 2023-09-14
Payer: OTHER MISCELLANEOUS

## 2023-09-14 VITALS
DIASTOLIC BLOOD PRESSURE: 86 MMHG | WEIGHT: 304.6 LBS | HEIGHT: 75 IN | HEART RATE: 84 BPM | BODY MASS INDEX: 37.87 KG/M2 | SYSTOLIC BLOOD PRESSURE: 123 MMHG

## 2023-09-14 DIAGNOSIS — R22.41 MASS OF RIGHT KNEE: Primary | ICD-10-CM

## 2023-09-14 DIAGNOSIS — D48.1 NEOPLASM OF UNCERTAIN BEHAVIOR OF CONNECTIVE AND OTHER SOFT TISSUE: ICD-10-CM

## 2023-09-14 PROBLEM — M25.519 SHOULDER JOINT PAIN: Status: ACTIVE | Noted: 2023-09-14

## 2023-09-14 PROBLEM — I10 HYPERTENSION: Status: ACTIVE | Noted: 2023-09-14

## 2023-09-14 PROCEDURE — 99245 OFF/OP CONSLTJ NEW/EST HI 55: CPT | Performed by: STUDENT IN AN ORGANIZED HEALTH CARE EDUCATION/TRAINING PROGRAM

## 2023-09-14 RX ORDER — GABAPENTIN 300 MG/1
300 CAPSULE ORAL ONCE
OUTPATIENT
Start: 2023-09-14 | End: 2023-09-14

## 2023-09-14 RX ORDER — ACETAMINOPHEN 325 MG/1
975 TABLET ORAL ONCE
OUTPATIENT
Start: 2023-09-14 | End: 2023-09-14

## 2023-09-14 RX ORDER — TRANEXAMIC ACID 10 MG/ML
1000 INJECTION, SOLUTION INTRAVENOUS ONCE
OUTPATIENT
Start: 2023-09-14 | End: 2023-09-14

## 2023-09-14 RX ORDER — CHLORHEXIDINE GLUCONATE 4 G/100ML
SOLUTION TOPICAL DAILY PRN
OUTPATIENT
Start: 2023-09-14

## 2023-09-14 NOTE — LETTER
September 14, 2023     Patient: Danial Calixto  YOB: 1965  Date of Visit: 9/14/2023      To Whom it May Concern:    Danial Calixto is under my professional care. Demetrio Parsons was seen in my office on 9/14/2023. Demetrio Parsons will continue with light duty restrictions. If you have any questions or concerns, please don't hesitate to call.          Sincerely,      Lucie Likes, DO        CC: No Recipients

## 2023-09-14 NOTE — H&P (VIEW-ONLY)
Orthopedic Surgery Office Note  Walker Sheikh (89 y.o. male)  : 1965 Encounter Date: 2023  Dr. Collin Alvarenga DO, Orthopedic Surgeon  Orthopedic Oncology & Sarcoma Surgery   Phone:[phone] YJD:[phone]    Assessment and Plan: Walker Sheikh is a 61 y.o. male with:    1. 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 Right knee pain   - due to injury and history of degenerative changes, may have chronic knee pain following removal of lesion     3. Comorbidity, including: HTN  - continue current management     Procedure:  No procedures performed    Surgical Planning:   The patient is indicated for surgical intervention with excision of right knee mass, with dissection of vessels and nerves. Risks and benefits of the treatment options and surgery were discussed in detail with the patient by Dr. Mayr Bañuelos. The risks of surgery including infection, bleeding, injury to nerves, injury to the vessels, excess scar tissue formation, risk of failure of the procedure, the possible need for further surgery, and potential risk of loss of limb and life. Specific risks including recurrence of the lesion, persistent pain, need for future surgeries; wound healing problems associated with posterior knee approach. After weighing all the treatment options available, the patient has opted for surgical intervention and informed consent was obtained. We will schedule the patient to be seen back postoperatively.      Pre-op instructions  Medications:  Hold anticoagulation therapy 5-7 days prior to surgery date  Hold vitamins/minerals/supplements/ NSAIDs 7 days prior to surgery to decrease bleeding risk. Hold diabetic medications morning of surgery. Hold Ace/Arbs/CCB day of surgery  OK to take rest of your medications morning of surgery with small sip of water including pain medication. NPO night before surgery at midnight  Advised to discuss this with their prescribers as well. - crutches, possible knee immobilizer     Follow up: No follow-ups on file.   __________________________________________________________________    History of Present Illness:     Ness Cutler is a 61 y.o. male with history of no significant PMH  who presents for consultation at the request of Michelle Haro MD regarding right posterior knee mass on MRI . First noted June 5th, unloading truck, fell onto a chain after a varus/valgus moment. Swelling noted after, no bruising . At first, difficulty walking, still able to walk now, but unable to rest in flexion. Pain was all over, now localized to posterior aspect and along MCL. Feels clicking through flexion and extension. Had MRI prior in June, was not notified of any mass at this time. Currently working light duty in the office. At baseline patient gaits without assistance. No noted constitutional symptoms such as fever, chills, night sweats, fatigue, weight gains/losses. No noted  chest pain/shortness of breath. Patient has no noted personal history of cancer. Oncology History    No history exists. Review of Systems:   Allergies, medications, past medical/surgical/family/social history have been reviewed. Complete 12 system review performed and found to be negative except: except as per mentioned in HPI.     Physical Examination:   Height: 6' 3" (190.5 cm)  Weight - Scale: (!) 138 kg (304 lb 9.6 oz)  BMI (Calculated): 38.1  BSA (Calculated - m2): 2.62 sq meters     Vitals: 09/14/23 0757   BP: 123/86   Pulse: 84     Body mass index is 38.07 kg/m². General: alert and oriented; well nourished/well developed; no apparent distress. Present with wife  Psychiatric: normal mood and affect  HEENT: NCAT. Head/neck - full range of motion. Lungs: breathing comfortably; equal symmetric chest expansion. Abdomen: soft, non-tender, non-distended. Skin: warm; dry; no lesions, rashes, petechiae or purpura; no clubbing, no cyanosis, no edema,     Extremity: Right knee   Inspection: no edema, skin abnormalities throughout   Palpation: no palpable masses or lesions   Range of motion of joints: WFL range of motion all extremities. Motor strength: WNL all extremities. Dorsal/Plantar flexion: intact. Sensation: grossly intact to all extremities. Pulses: intact distally   Gait: antalgic gait  Seen sitting with leg propped in extension      IMAGING RESULTS, All images personally review today by Dr. Kiki Barajas  Study: MRI wo contrast right knee  Date: 9/1/23  Impression: I have personally reviewed all relevant imaging. I have read and agree with the radiologist report. My impression is as follows:    Trace joint effusion. There is a lobulated intra-articular mass seen in the posterior intercondylar notch, posterior and medial to the posterior cruciate ligament. This measures approximately 3.4 x 1.9 x 1.6 cm. This demonstrates heterogeneous T2 signal and heterogeneous intermediate T1 signal. Mild patellofemoral compartment cartilage thinning. Corticated osseous fragments proximal to the tibial tuberosity likely sequela of remote Osgood-Schlatter disease. Study: MRI wo contrast right knee  Date: 6/26/23  Impression: I have personally reviewed all relevant imaging. I have read and agree with the radiologist report.   My impression is as follows:           Pathology:   N/A    Review of referring provider's records:  Referring provider: Dr. Woody Living  Date: 9/8/23  Impression:   61 y.o. male right knee mild OA lobulated intra articular mass posterior intercondylar notch, posterior and medial to PCL measuring 3.4x1. 9x1.6CM, differential considerations localized nodular synovitis, synovial sarcoma. Ice to knee for 20 minutes at least 1-2 times daily. OTC NSAIDS prn for pain. Patient will be referred to Dr Laverne Chavarria to see if this synovial tumor is related to pain medial aspect of knee and further evaluate to make sure mass is not concerning. MRI shows no medial meniscal tear or ligamentous tears. Maintain HEP as shown by therapy. Patient Care team:   Patient Care Team:  Mattie Trevino MD as PCP - General (Family Medicine)     No past medical history on file. No past surgical history on file. Current Outpatient Medications:   •  amLODIPine (NORVASC) 10 mg tablet, TAKE 1 TABLET DAILY, Disp: 90 tablet, Rfl: 3  •  ibuprofen (ADVIL,MOTRIN) 100 MG chewable tablet, Chew 100 mg every 8 (eight) hours as needed for mild pain, Disp: , Rfl:   •  triamcinolone (KENALOG) 0.1 % lotion, Apply topically 3 (three) times a day, Disp: 60 mL, Rfl: 0  •  benzonatate (TESSALON) 200 MG capsule, Take 1 capsule (200 mg total) by mouth 3 (three) times a day as needed for cough (Patient not taking: Reported on 7/10/2023), Disp: 20 capsule, Rfl: 0  No Known Allergies  No family history on file.   Social History     Socioeconomic History   • Marital status: /Civil Union     Spouse name: Not on file   • Number of children: Not on file   • Years of education: Not on file   • Highest education level: Not on file   Occupational History   • Not on file   Tobacco Use   • Smoking status: Former   • Smokeless tobacco: Former   Substance and Sexual Activity   • Alcohol use: Not on file   • Drug use: Not on file   • Sexual activity: Not on file   Other Topics Concern   • Not on file   Social History Narrative   • Not on file     Social Determinants of Health     Financial Resource Strain: Not on file   Food Insecurity: Not on file   Transportation Needs: Not on file   Physical Activity: Not on file   Stress: Not on file   Social Connections: Not on file   Intimate Partner Violence: Not on file   Housing Stability: Not on file       60 minutes was spent in the coordination of care, reviewing of imaging and with the patient on the date of service    I, [facility] Kem CALDERON, scribed this note in conjunction with and in the presence of Dr. Tyra Tyler, who performed parts of the services as described in this documentation    [address] [facility] KVNG Brownlee   9/14/2023 9:47 AM     Problem List Items Addressed This Visit    None  Visit Diagnoses     Mass of right knee    -  Primary    Relevant Orders    MRI knee right w wo contrast    Case request operating room: posterior knee lesion - EXCISION BIOPSY TISSUE LESION/MASS LOWER EXTREMITY, DISSECTION/REPAIR NERVE (Completed)    Type and screen    Comprehensive metabolic panel    CBC and differential    APTT    Protime-INR    EKG 12 lead    Ambulatory Referral to Family Practice    Neoplasm of uncertain behavior of connective and other soft tissue        Relevant Orders    MRI knee right w wo contrast    Case request operating room: posterior knee lesion - EXCISION BIOPSY TISSUE LESION/MASS LOWER EXTREMITY, DISSECTION/REPAIR NERVE (Completed)    Type and screen    Comprehensive metabolic panel    CBC and differential    APTT    Protime-INR    EKG 12 lead    Ambulatory Referral to [facility]

## 2023-09-14 NOTE — PROGRESS NOTES
Orthopedic Surgery Office Note  David Lee (89 y.o. male)  : 1965 Encounter Date: 2023  Dr. Vlad Jones DO, Orthopedic Surgeon  Orthopedic Oncology & Sarcoma Surgery   Phone:[phone]    Assessment and Plan: David Lee is a 61 y.o. male with:    1. 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 Right knee pain   - due to injury and history of degenerative changes, may have chronic knee pain following removal of lesion     3. Comorbidity, including: HTN  - continue current management     Procedure:  No procedures performed    Surgical Planning:   The patient is indicated for surgical intervention with excision of right knee mass, with dissection of vessels and nerves. Risks and benefits of the treatment options and surgery were discussed in detail with the patient by Dr. Amparo Kimball. The risks of surgery including infection, bleeding, injury to nerves, injury to the vessels, excess scar tissue formation, risk of failure of the procedure, the possible need for further surgery, and potential risk of loss of limb and life. Specific risks including recurrence of the lesion, persistent pain, need for future surgeries; wound healing problems associated with posterior knee approach. After weighing all the treatment options available, the patient has opted for surgical intervention and informed consent was obtained. We will schedule the patient to be seen back postoperatively.      Pre-op instructions  Medications:  Hold anticoagulation therapy 5-7 days prior to surgery date  Hold vitamins/minerals/supplements/ NSAIDs 7 days prior to surgery to decrease bleeding risk. Hold diabetic medications morning of surgery. Hold Ace/Arbs/CCB day of surgery  OK to take rest of your medications morning of surgery with small sip of water including pain medication. NPO night before surgery at midnight  Advised to discuss this with their prescribers as well. - crutches, possible knee immobilizer     Follow up: No follow-ups on file.   __________________________________________________________________    History of Present Illness:     Li Burnett is a 61 y.o. male with history of no significant PMH  who presents for consultation at the request of Mattie Trevino MD regarding right posterior knee mass on MRI . First noted June 5th, unloading truck, fell onto a chain after a varus/valgus moment. Swelling noted after, no bruising . At first, difficulty walking, still able to walk now, but unable to rest in flexion. Pain was all over, now localized to posterior aspect and along MCL. Feels clicking through flexion and extension. Had MRI prior in June, was not notified of any mass at this time. Currently working light duty in the office. At baseline patient gaits without assistance. No noted constitutional symptoms such as fever, chills, night sweats, fatigue, weight gains/losses. No noted  chest pain/shortness of breath. Patient has no noted personal history of cancer. Oncology History    No history exists. Review of Systems:   Allergies, medications, past medical/surgical/family/social history have been reviewed. Complete 12 system review performed and found to be negative except: except as per mentioned in HPI.     Physical Examination:   Height: 6' 3" (190.5 cm)  Weight - Scale: (!) 138 kg (304 lb 9.6 oz)  BMI (Calculated): 38.1  BSA (Calculated - m2): 2.62 sq meters     Vitals: 09/14/23 0757   BP: 123/86   Pulse: 84     Body mass index is 38.07 kg/m². General: alert and oriented; well nourished/well developed; no apparent distress. Present with wife  Psychiatric: normal mood and affect  HEENT: NCAT. Head/neck - full range of motion. Lungs: breathing comfortably; equal symmetric chest expansion. Abdomen: soft, non-tender, non-distended. Skin: warm; dry; no lesions, rashes, petechiae or purpura; no clubbing, no cyanosis, no edema,     Extremity: Right knee   Inspection: no edema, skin abnormalities throughout   Palpation: no palpable masses or lesions   Range of motion of joints: WFL range of motion all extremities. Motor strength: WNL all extremities. Dorsal/Plantar flexion: intact. Sensation: grossly intact to all extremities. Pulses: intact distally   Gait: antalgic gait  Seen sitting with leg propped in extension      IMAGING RESULTS, All images personally review today by Dr. Abner Borrego  Study: MRI wo contrast right knee  Date: 9/1/23  Impression: I have personally reviewed all relevant imaging. I have read and agree with the radiologist report. My impression is as follows:    Trace joint effusion. There is a lobulated intra-articular mass seen in the posterior intercondylar notch, posterior and medial to the posterior cruciate ligament. This measures approximately 3.4 x 1.9 x 1.6 cm. This demonstrates heterogeneous T2 signal and heterogeneous intermediate T1 signal. Mild patellofemoral compartment cartilage thinning. Corticated osseous fragments proximal to the tibial tuberosity likely sequela of remote Osgood-Schlatter disease. Study: MRI wo contrast right knee  Date: 6/26/23  Impression: I have personally reviewed all relevant imaging. I have read and agree with the radiologist report.   My impression is as follows:           Pathology:   N/A    Review of referring provider's records:  Referring provider: Dr. Nidia Gale  Date: 9/8/23  Impression:   61 y.o. male right knee mild OA lobulated intra articular mass posterior intercondylar notch, posterior and medial to PCL measuring 3.4x1. 9x1.6CM, differential considerations localized nodular synovitis, synovial sarcoma. Ice to knee for 20 minutes at least 1-2 times daily. OTC NSAIDS prn for pain. Patient will be referred to Dr Gayathri Beckham to see if this synovial tumor is related to pain medial aspect of knee and further evaluate to make sure mass is not concerning. MRI shows no medial meniscal tear or ligamentous tears. Maintain HEP as shown by therapy. Patient Care team:   Patient Care Team:  Cy Loaiza MD as PCP - General (Family Medicine)     No past medical history on file. No past surgical history on file. Current Outpatient Medications:   •  amLODIPine (NORVASC) 10 mg tablet, TAKE 1 TABLET DAILY, Disp: 90 tablet, Rfl: 3  •  ibuprofen (ADVIL,MOTRIN) 100 MG chewable tablet, Chew 100 mg every 8 (eight) hours as needed for mild pain, Disp: , Rfl:   •  triamcinolone (KENALOG) 0.1 % lotion, Apply topically 3 (three) times a day, Disp: 60 mL, Rfl: 0  •  benzonatate (TESSALON) 200 MG capsule, Take 1 capsule (200 mg total) by mouth 3 (three) times a day as needed for cough (Patient not taking: Reported on 7/10/2023), Disp: 20 capsule, Rfl: 0  No Known Allergies  No family history on file.   Social History     Socioeconomic History   • Marital status: /Civil Union     Spouse name: Not on file   • Number of children: Not on file   • Years of education: Not on file   • Highest education level: Not on file   Occupational History   • Not on file   Tobacco Use   • Smoking status: Former   • Smokeless tobacco: Former   Substance and Sexual Activity   • Alcohol use: Not on file   • Drug use: Not on file   • Sexual activity: Not on file   Other Topics Concern   • Not on file   Social History Narrative   • Not on file     Social Determinants of Health     Financial Resource Strain: Not on file   Food Insecurity: Not on file   Transportation Needs: Not on file   Physical Activity: Not on file   Stress: Not on file   Social Connections: Not on file   Intimate Partner Violence: Not on file   Housing Stability: Not on file       60 minutes was spent in the coordination of care, reviewing of imaging and with the patient on the date of service    I, Gonsalo Nieto PA-C, scribed this note in conjunction with and in the presence of Dr. Keshav Odell, who performed parts of the services as described in this documentation    Gonsalo Nieto PA-C   9/14/2023 9:47 AM     Problem List Items Addressed This Visit    None  Visit Diagnoses     Mass of right knee    -  Primary    Relevant Orders    MRI knee right w wo contrast    Case request operating room: posterior knee lesion - EXCISION BIOPSY TISSUE LESION/MASS LOWER EXTREMITY, DISSECTION/REPAIR NERVE (Completed)    Type and screen    Comprehensive metabolic panel    CBC and differential    APTT    Protime-INR    EKG 12 lead    Ambulatory Referral to Family Practice    Neoplasm of uncertain behavior of connective and other soft tissue        Relevant Orders    MRI knee right w wo contrast    Case request operating room: posterior knee lesion - EXCISION BIOPSY TISSUE LESION/MASS LOWER EXTREMITY, DISSECTION/REPAIR NERVE (Completed)    Type and screen    Comprehensive metabolic panel    CBC and differential    APTT    Protime-INR    EKG 12 lead    Ambulatory Referral to [facility]

## 2023-09-14 NOTE — LETTER
2023     Marques Morales MD  Preet Maggie Darío    Patient: Ruddy Ruiz   YOB: 1965   Date of Visit: 2023       Dear Dr. Burleson Artist: Thank you for referring Ruddy Ruiz to me for evaluation. Below are my notes for this consultation. If you have questions, please do not hesitate to call me. I look forward to following your patient along with you. Sincerely,        Colette Mathew DO        CC: No Recipients    Colette Mathew DO  2023  1:33 PM  Signed  Orthopedic Surgery Office Note  Ruddy Ruiz (38 y.o. male)  : 1965 Encounter Date: 2023  Dr. Colette Mathew DO, Orthopedic Surgeon  Orthopedic Oncology & Sarcoma Surgery   Phone:[phone] [facility]:[phone]    Assessment and Plan: Ruddy Ruiz is a 61 y.o. male with:    1. 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 Right knee pain   - due to injury and history of degenerative changes, may have chronic knee pain following removal of lesion     3. Comorbidity, including: HTN  - continue current management     Procedure:  No procedures performed    Surgical Planning:   The patient is indicated for surgical intervention with excision of right knee mass, with dissection of vessels and nerves. Risks and benefits of the treatment options and surgery were discussed in detail with the patient by Dr. Dhara Elliott.  The risks of surgery including infection, bleeding, injury to nerves, injury to the vessels, excess scar tissue formation, risk of failure of the procedure, the possible need for further surgery, and potential risk of loss of limb and life. Specific risks including recurrence of the lesion, persistent pain, need for future surgeries; wound healing problems associated with posterior knee approach. After weighing all the treatment options available, the patient has opted for surgical intervention and informed consent was obtained. We will schedule the patient to be seen back postoperatively. Pre-op instructions  Medications:  Hold anticoagulation therapy 5-7 days prior to surgery date  Hold vitamins/minerals/supplements/ NSAIDs 7 days prior to surgery to decrease bleeding risk. Hold diabetic medications morning of surgery. Hold Ace/Arbs/CCB day of surgery  OK to take rest of your medications morning of surgery with small sip of water including pain medication. NPO night before surgery at midnight  Advised to discuss this with their prescribers as well. - crutches, possible knee immobilizer     Follow up: No follow-ups on file.   __________________________________________________________________    History of Present Illness:     Michele Mendoza is a 61 y.o. male with history of no significant PMH  who presents for consultation at the request of Priyanka Monzon MD regarding right posterior knee mass on MRI . First noted June 5th, unloading truck, fell onto a chain after a varus/valgus moment. Swelling noted after, no bruising . At first, difficulty walking, still able to walk now, but unable to rest in flexion. Pain was all over, now localized to posterior aspect and along MCL. Feels clicking through flexion and extension. Had MRI prior in June, was not notified of any mass at this time. Currently working light duty in the office. At baseline patient gaits without assistance. No noted constitutional symptoms such as fever, chills, night sweats, fatigue, weight gains/losses.  No noted chest pain/shortness of breath. Patient has no noted personal history of cancer. Oncology History    No history exists. Review of Systems:   Allergies, medications, past medical/surgical/family/social history have been reviewed. Complete 12 system review performed and found to be negative except: except as per mentioned in HPI. Physical Examination:   Height: 6' 3" (190.5 cm)  Weight - Scale: (!) 138 kg (304 lb 9.6 oz)  BMI (Calculated): 38.1  BSA (Calculated - m2): 2.62 sq meters     Vitals:    09/14/23 0757   BP: 123/86   Pulse: 84     Body mass index is 38.07 kg/m². General: alert and oriented; well nourished/well developed; no apparent distress. Present with wife  Psychiatric: normal mood and affect  HEENT: NCAT. Head/neck - full range of motion. Lungs: breathing comfortably; equal symmetric chest expansion. Abdomen: soft, non-tender, non-distended. Skin: warm; dry; no lesions, rashes, petechiae or purpura; no clubbing, no cyanosis, no edema,     Extremity: Right knee   Inspection: no edema, skin abnormalities throughout   Palpation: no palpable masses or lesions   Range of motion of joints: WFL range of motion all extremities. Motor strength: WNL all extremities. Dorsal/Plantar flexion: intact. Sensation: grossly intact to all extremities. Pulses: intact distally   Gait: antalgic gait  Seen sitting with leg propped in extension      IMAGING RESULTS, All images personally review today by Dr. Miguel Cavazos  Study: MRI wo contrast right knee  Date: 9/1/23  Impression: I have personally reviewed all relevant imaging. I have read and agree with the radiologist report. My impression is as follows:    Trace joint effusion. There is a lobulated intra-articular mass seen in the posterior intercondylar notch, posterior and medial to the posterior cruciate ligament. This measures approximately 3.4 x 1.9 x 1.6 cm.  This demonstrates heterogeneous T2 signal and heterogeneous intermediate T1 signal. Mild patellofemoral compartment cartilage thinning. Corticated osseous fragments proximal to the tibial tuberosity likely sequela of remote Osgood-Schlatter disease. Study: MRI wo contrast right knee  Date: 6/26/23  Impression: I have personally reviewed all relevant imaging. I have read and agree with the radiologist report. My impression is as follows:           Pathology:   N/A    Review of referring provider's records:  Referring provider: Dr. Aryan Jacobo  Date: 9/8/23  Impression:   61 y.o. 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 9x1.6CM, differential considerations localized nodular synovitis, synovial sarcoma. Ice to knee for 20 minutes at least 1-2 times daily. OTC NSAIDS prn for pain. Patient will be referred to Dr Anthony Christine to see if this synovial tumor is related to pain medial aspect of knee and further evaluate to make sure mass is not concerning. MRI shows no medial meniscal tear or ligamentous tears. Maintain HEP as shown by therapy. Patient Care team:   Patient Care Team:  Charlotte Alejandro MD as PCP - General (Family Medicine)     No past medical history on file. No past surgical history on file. Current Outpatient Medications:   •  amLODIPine (NORVASC) 10 mg tablet, TAKE 1 TABLET DAILY, Disp: 90 tablet, Rfl: 3  •  ibuprofen (ADVIL,MOTRIN) 100 MG chewable tablet, Chew 100 mg every 8 (eight) hours as needed for mild pain, Disp: , Rfl:   •  triamcinolone (KENALOG) 0.1 % lotion, Apply topically 3 (three) times a day, Disp: 60 mL, Rfl: 0  •  benzonatate (TESSALON) 200 MG capsule, Take 1 capsule (200 mg total) by mouth 3 (three) times a day as needed for cough (Patient not taking: Reported on 7/10/2023), Disp: 20 capsule, Rfl: 0  No Known Allergies  No family history on file.   Social History     Socioeconomic History   • Marital status: /Civil Union     Spouse name: Not on file   • Number of children: Not on file   • Years of education: Not on file   • Highest education level: Not on file   Occupational History   • Not on file   Tobacco Use   • Smoking status: Former   • Smokeless tobacco: Former   Substance and Sexual Activity   • Alcohol use: Not on file   • Drug use: Not on file   • Sexual activity: Not on file   Other Topics Concern   • Not on file   Social History Narrative   • Not on file     Social Determinants of Health     Financial Resource Strain: Not on file   Food Insecurity: Not on file   Transportation Needs: Not on file   Physical Activity: Not on file   Stress: Not on file   Social Connections: Not on file   Intimate Partner Violence: Not on file   Housing Stability: Not on file       60 minutes was spent in the coordination of care, reviewing of imaging and with the patient on the date of service    Negrito PENDLETON PA-C, scribed this note in conjunction with and in the presence of Dr. Izabella Grace, who performed parts of the services as described in this documentation    KVNG Mahan   9/14/2023 9:47 AM     Problem List Items Addressed This Visit    None  Visit Diagnoses       Mass of right knee    -  Primary    Relevant Orders    MRI knee right w wo contrast    Case request operating room: posterior knee lesion - EXCISION BIOPSY TISSUE LESION/MASS LOWER EXTREMITY, DISSECTION/REPAIR NERVE (Completed)    Type and screen    Comprehensive metabolic panel    CBC and differential    APTT    Protime-INR    EKG 12 lead    Ambulatory Referral to Family Practice    Neoplasm of uncertain behavior of connective and other soft tissue        Relevant Orders    MRI knee right w wo contrast    Case request operating room: posterior knee lesion - EXCISION BIOPSY TISSUE LESION/MASS LOWER EXTREMITY, DISSECTION/REPAIR NERVE (Completed)    Type and screen    Comprehensive metabolic panel    CBC and differential    APTT    Protime-INR    EKG 12 lead    Ambulatory Referral to [facility]

## 2023-09-15 ENCOUNTER — TELEPHONE (OUTPATIENT)
Age: 58
End: 2023-09-15

## 2023-09-19 ENCOUNTER — APPOINTMENT (OUTPATIENT)
Dept: PHYSICAL THERAPY | Facility: MEDICAL CENTER | Age: 58
End: 2023-09-19
Payer: OTHER MISCELLANEOUS

## 2023-09-20 ENCOUNTER — TELEPHONE (OUTPATIENT)
Age: 58
End: 2023-09-20

## 2023-09-20 NOTE — TELEPHONE ENCOUNTER
Caller: Patient     Doctor: Dhara Elliott     Reason for call: Patient requesting to speak with the doctor re: right knee and what was told to the [facility] if this injury was work related.  Please call patient     Call back#: [phone]

## 2023-09-27 ENCOUNTER — HOSPITAL ENCOUNTER (OUTPATIENT)
Facility: MEDICAL CENTER | Age: 58
Discharge: HOME | End: 2023-09-27
Payer: COMMERCIAL

## 2023-09-27 DIAGNOSIS — D48.19 NEOPLASM OF UNCERTAIN BEHAVIOR OF CONNECTIVE AND OTHER SOFT TISSUE: ICD-10-CM

## 2023-09-27 DIAGNOSIS — R22.41 MASS OF RIGHT KNEE: ICD-10-CM

## 2023-09-27 PROCEDURE — A9585 GADOBUTROL INJECTION: HCPCS

## 2023-09-27 PROCEDURE — 73723 MRI JOINT LWR EXTR W/O&W/DYE: CPT

## 2023-09-27 PROCEDURE — G1004 CDSM NDSC: HCPCS

## 2023-09-27 RX ORDER — GADOBUTROL 604.72 MG/ML
13 INJECTION INTRAVENOUS
Status: COMPLETED | OUTPATIENT
Start: 2023-09-27 | End: 2023-09-27

## 2023-09-27 RX ADMIN — GADOBUTROL 13 ML: 604.72 INJECTION INTRAVENOUS at 09:04

## 2023-10-03 ENCOUNTER — APPOINTMENT (OUTPATIENT)
Dept: LAB | Facility: HOSPITAL | Age: 58
End: 2023-10-03
Payer: OTHER MISCELLANEOUS

## 2023-10-03 DIAGNOSIS — D48.19 NEOPLASM OF UNCERTAIN BEHAVIOR OF CONNECTIVE AND OTHER SOFT TISSUE: ICD-10-CM

## 2023-10-03 DIAGNOSIS — R22.41 MASS OF RIGHT KNEE: ICD-10-CM

## 2023-10-03 LAB
ABO GROUP BLD: NORMAL
ALBUMIN SERPL BCP-MCNC: 4.4 G/DL (ref 3.5–5)
ALP SERPL-CCNC: 85 U/L (ref 34–104)
ALT SERPL W P-5'-P-CCNC: 20 U/L (ref 7–52)
ANION GAP SERPL CALCULATED.3IONS-SCNC: 6 MMOL/L
APTT PPP: 29 SECONDS (ref 23–37)
AST SERPL W P-5'-P-CCNC: 13 U/L (ref 13–39)
ATRIAL RATE: 80 BPM
BASOPHILS # BLD AUTO: 0.02 THOUSANDS/ÂΜL (ref 0–0.1)
BASOPHILS NFR BLD AUTO: 0 % (ref 0–1)
BILIRUB SERPL-MCNC: 0.51 MG/DL (ref 0.2–1)
BLD GP AB SCN SERPL QL: NEGATIVE
BUN SERPL-MCNC: 20 MG/DL (ref 5–25)
CALCIUM SERPL-MCNC: 9.1 MG/DL (ref 8.4–10.2)
CHLORIDE SERPL-SCNC: 103 MMOL/L (ref 96–108)
CO2 SERPL-SCNC: 26 MMOL/L (ref 21–32)
CREAT SERPL-MCNC: 1.08 MG/DL (ref 0.6–1.3)
EOSINOPHIL # BLD AUTO: 0.13 THOUSAND/ÂΜL (ref 0–0.61)
EOSINOPHIL NFR BLD AUTO: 2 % (ref 0–6)
ERYTHROCYTE [DISTWIDTH] IN BLOOD BY AUTOMATED COUNT: 12.3 % (ref 11.6–15.1)
GFR SERPL CREATININE-BSD FRML MDRD: 74 ML/MIN/1.73SQ M
GLUCOSE SERPL-MCNC: 162 MG/DL (ref 65–140)
HCT VFR BLD AUTO: 45.7 % (ref 36.5–49.3)
HGB BLD-MCNC: 15.1 G/DL (ref 12–17)
IMM GRANULOCYTES # BLD AUTO: 0.03 THOUSAND/UL (ref 0–0.2)
IMM GRANULOCYTES NFR BLD AUTO: 1 % (ref 0–2)
INR PPP: 1.05 (ref 0.84–1.19)
LYMPHOCYTES # BLD AUTO: 1.88 THOUSANDS/ÂΜL (ref 0.6–4.47)
LYMPHOCYTES NFR BLD AUTO: 30 % (ref 14–44)
MCH RBC QN AUTO: 27.7 PG (ref 26.8–34.3)
MCHC RBC AUTO-ENTMCNC: 33 G/DL (ref 31.4–37.4)
MCV RBC AUTO: 84 FL (ref 82–98)
MONOCYTES # BLD AUTO: 0.67 THOUSAND/ÂΜL (ref 0.17–1.22)
MONOCYTES NFR BLD AUTO: 11 % (ref 4–12)
NEUTROPHILS # BLD AUTO: 3.63 THOUSANDS/ÂΜL (ref 1.85–7.62)
NEUTS SEG NFR BLD AUTO: 56 % (ref 43–75)
NRBC BLD AUTO-RTO: 0 /100 WBCS
P AXIS: 32 DEGREES
PLATELET # BLD AUTO: 236 THOUSANDS/UL (ref 149–390)
PMV BLD AUTO: 9.5 FL (ref 8.9–12.7)
POTASSIUM SERPL-SCNC: 3.7 MMOL/L (ref 3.5–5.3)
PR INTERVAL: 190 MS
PROT SERPL-MCNC: 7.3 G/DL (ref 6.4–8.4)
PROTHROMBIN TIME: 13.7 SECONDS (ref 11.6–14.5)
QRS AXIS: -7 DEGREES
QRSD INTERVAL: 102 MS
QT INTERVAL: 388 MS
QTC INTERVAL: 447 MS
RBC # BLD AUTO: 5.45 MILLION/UL (ref 3.88–5.62)
RH BLD: POSITIVE
SODIUM SERPL-SCNC: 135 MMOL/L (ref 135–147)
SPECIMEN EXPIRATION DATE: NORMAL
T WAVE AXIS: 6 DEGREES
VENTRICULAR RATE: 80 BPM
WBC # BLD AUTO: 6.36 THOUSAND/UL (ref 4.31–10.16)

## 2023-10-03 PROCEDURE — 85025 COMPLETE CBC W/AUTO DIFF WBC: CPT

## 2023-10-03 PROCEDURE — 86850 RBC ANTIBODY SCREEN: CPT

## 2023-10-03 PROCEDURE — 85730 THROMBOPLASTIN TIME PARTIAL: CPT

## 2023-10-03 PROCEDURE — 36415 COLL VENOUS BLD VENIPUNCTURE: CPT

## 2023-10-03 PROCEDURE — 86900 BLOOD TYPING SEROLOGIC ABO: CPT

## 2023-10-03 PROCEDURE — 86901 BLOOD TYPING SEROLOGIC RH(D): CPT

## 2023-10-03 PROCEDURE — 85610 PROTHROMBIN TIME: CPT

## 2023-10-03 PROCEDURE — 80053 COMPREHEN METABOLIC PANEL: CPT

## 2023-10-10 LAB
ABO GROUP BLD: NORMAL
BLD GP AB SCN SERPL QL: NEGATIVE
RH BLD: POSITIVE
SPECIMEN EXPIRATION DATE: NORMAL

## 2023-10-11 ENCOUNTER — ANESTHESIA (OUTPATIENT)
Dept: PERIOP | Facility: HOSPITAL | Age: 58
DRG: 217 | End: 2023-10-11
Payer: OTHER MISCELLANEOUS

## 2023-10-11 ENCOUNTER — APPOINTMENT (OUTPATIENT)
Dept: RADIOLOGY | Facility: HOSPITAL | Age: 58
DRG: 217 | End: 2023-10-11
Payer: OTHER MISCELLANEOUS

## 2023-10-11 ENCOUNTER — HOSPITAL ENCOUNTER (INPATIENT)
Facility: HOSPITAL | Age: 58
LOS: 1 days | Discharge: HOME | DRG: 217 | End: 2023-10-12
Attending: STUDENT IN AN ORGANIZED HEALTH CARE EDUCATION/TRAINING PROGRAM | Admitting: STUDENT IN AN ORGANIZED HEALTH CARE EDUCATION/TRAINING PROGRAM
Payer: OTHER MISCELLANEOUS

## 2023-10-11 ENCOUNTER — ANESTHESIA EVENT (OUTPATIENT)
Dept: PERIOP | Facility: HOSPITAL | Age: 58
DRG: 217 | End: 2023-10-11
Payer: OTHER MISCELLANEOUS

## 2023-10-11 DIAGNOSIS — R22.41 MASS OF RIGHT KNEE: ICD-10-CM

## 2023-10-11 PROBLEM — E66.9 OBESITY: Status: ACTIVE | Noted: 2023-10-11

## 2023-10-11 LAB
ABO GROUP BLD: NORMAL
RH BLD: POSITIVE

## 2023-10-11 PROCEDURE — 0JBN0ZZ EXCISION OF RIGHT LOWER LEG SUBCUTANEOUS TISSUE AND FASCIA, OPEN APPROACH: ICD-10-PCS | Performed by: STUDENT IN AN ORGANIZED HEALTH CARE EDUCATION/TRAINING PROGRAM

## 2023-10-11 PROCEDURE — 01QG0ZZ REPAIR TIBIAL NERVE, OPEN APPROACH: ICD-10-PCS | Performed by: STUDENT IN AN ORGANIZED HEALTH CARE EDUCATION/TRAINING PROGRAM

## 2023-10-11 PROCEDURE — 01QF0ZZ REPAIR SCIATIC NERVE, OPEN APPROACH: ICD-10-PCS | Performed by: STUDENT IN AN ORGANIZED HEALTH CARE EDUCATION/TRAINING PROGRAM

## 2023-10-11 PROCEDURE — 73560 X-RAY EXAM OF KNEE 1 OR 2: CPT

## 2023-10-11 PROCEDURE — 27339 EXC THIGH/KNEE TUM DEP 5CM/>: CPT | Performed by: STUDENT IN AN ORGANIZED HEALTH CARE EDUCATION/TRAINING PROGRAM

## 2023-10-11 PROCEDURE — 88307 TISSUE EXAM BY PATHOLOGIST: CPT | Performed by: PATHOLOGY

## 2023-10-11 RX ORDER — ONDANSETRON 2 MG/ML
4 INJECTION INTRAMUSCULAR; INTRAVENOUS EVERY 6 HOURS PRN
Status: DISCONTINUED | OUTPATIENT
Start: 2023-10-11 | End: 2023-10-12 | Stop reason: HOSPADM

## 2023-10-11 RX ORDER — HYDROMORPHONE HYDROCHLORIDE 1 MG/ML
INJECTION, SOLUTION INTRAMUSCULAR; INTRAVENOUS; SUBCUTANEOUS AS NEEDED
Status: DISCONTINUED | OUTPATIENT
Start: 2023-10-11 | End: 2023-10-11

## 2023-10-11 RX ORDER — CHLORHEXIDINE GLUCONATE 4 G/100ML
SOLUTION TOPICAL DAILY PRN
Status: DISCONTINUED | OUTPATIENT
Start: 2023-10-11 | End: 2023-10-11 | Stop reason: HOSPADM

## 2023-10-11 RX ORDER — KETOROLAC TROMETHAMINE 30 MG/ML
INJECTION, SOLUTION INTRAMUSCULAR; INTRAVENOUS AS NEEDED
Status: DISCONTINUED | OUTPATIENT
Start: 2023-10-11 | End: 2023-10-11

## 2023-10-11 RX ORDER — LIDOCAINE HYDROCHLORIDE 20 MG/ML
INJECTION, SOLUTION EPIDURAL; INFILTRATION; INTRACAUDAL; PERINEURAL AS NEEDED
Status: DISCONTINUED | OUTPATIENT
Start: 2023-10-11 | End: 2023-10-11

## 2023-10-11 RX ORDER — ONDANSETRON 2 MG/ML
4 INJECTION INTRAMUSCULAR; INTRAVENOUS ONCE AS NEEDED
Status: DISCONTINUED | OUTPATIENT
Start: 2023-10-11 | End: 2023-10-11 | Stop reason: HOSPADM

## 2023-10-11 RX ORDER — FENTANYL CITRATE 50 UG/ML
INJECTION, SOLUTION INTRAMUSCULAR; INTRAVENOUS AS NEEDED
Status: DISCONTINUED | OUTPATIENT
Start: 2023-10-11 | End: 2023-10-11

## 2023-10-11 RX ORDER — ACETAMINOPHEN 500 MG
1000 TABLET ORAL EVERY 8 HOURS
Qty: 60 TABLET | Refills: 0 | Status: SHIPPED | OUTPATIENT
Start: 2023-10-11 | End: 2023-10-21

## 2023-10-11 RX ORDER — SENNA AND DOCUSATE SODIUM 50; 8.6 MG/1; MG/1
1 TABLET, FILM COATED ORAL DAILY
Qty: 14 TABLET | Refills: 0 | Status: SHIPPED | OUTPATIENT
Start: 2023-10-11 | End: 2023-10-25

## 2023-10-11 RX ORDER — ACETAMINOPHEN 325 MG/1
975 TABLET ORAL ONCE
Status: COMPLETED | OUTPATIENT
Start: 2023-10-11 | End: 2023-10-11

## 2023-10-11 RX ORDER — ONDANSETRON 2 MG/ML
INJECTION INTRAMUSCULAR; INTRAVENOUS AS NEEDED
Status: DISCONTINUED | OUTPATIENT
Start: 2023-10-11 | End: 2023-10-11

## 2023-10-11 RX ORDER — PROPOFOL 10 MG/ML
INJECTION, EMULSION INTRAVENOUS AS NEEDED
Status: DISCONTINUED | OUTPATIENT
Start: 2023-10-11 | End: 2023-10-11

## 2023-10-11 RX ORDER — GLYCOPYRROLATE 0.2 MG/ML
INJECTION INTRAMUSCULAR; INTRAVENOUS AS NEEDED
Status: DISCONTINUED | OUTPATIENT
Start: 2023-10-11 | End: 2023-10-11

## 2023-10-11 RX ORDER — BUPIVACAINE HYDROCHLORIDE 2.5 MG/ML
INJECTION, SOLUTION EPIDURAL; INFILTRATION; INTRACAUDAL AS NEEDED
Status: DISCONTINUED | OUTPATIENT
Start: 2023-10-11 | End: 2023-10-11 | Stop reason: HOSPADM

## 2023-10-11 RX ORDER — SODIUM CHLORIDE 9 MG/ML
125 INJECTION, SOLUTION INTRAVENOUS CONTINUOUS
Status: DISCONTINUED | OUTPATIENT
Start: 2023-10-11 | End: 2023-10-12 | Stop reason: HOSPADM

## 2023-10-11 RX ORDER — OXYCODONE HYDROCHLORIDE 10 MG/1
10 TABLET ORAL EVERY 4 HOURS PRN
Status: DISCONTINUED | OUTPATIENT
Start: 2023-10-11 | End: 2023-10-12 | Stop reason: HOSPADM

## 2023-10-11 RX ORDER — GABAPENTIN 300 MG/1
300 CAPSULE ORAL ONCE
Status: COMPLETED | OUTPATIENT
Start: 2023-10-11 | End: 2023-10-11

## 2023-10-11 RX ORDER — HYDROMORPHONE HCL/PF 1 MG/ML
0.5 SYRINGE (ML) INJECTION
Status: DISCONTINUED | OUTPATIENT
Start: 2023-10-11 | End: 2023-10-11 | Stop reason: HOSPADM

## 2023-10-11 RX ORDER — SENNOSIDES 8.6 MG
1 TABLET ORAL DAILY
Status: DISCONTINUED | OUTPATIENT
Start: 2023-10-12 | End: 2023-10-12 | Stop reason: HOSPADM

## 2023-10-11 RX ORDER — MAGNESIUM HYDROXIDE 1200 MG/15ML
LIQUID ORAL AS NEEDED
Status: DISCONTINUED | OUTPATIENT
Start: 2023-10-11 | End: 2023-10-11 | Stop reason: HOSPADM

## 2023-10-11 RX ORDER — CHLORHEXIDINE GLUCONATE ORAL RINSE 1.2 MG/ML
15 SOLUTION DENTAL ONCE
Status: COMPLETED | OUTPATIENT
Start: 2023-10-11 | End: 2023-10-11

## 2023-10-11 RX ORDER — ONDANSETRON 4 MG/1
4 TABLET, FILM COATED ORAL EVERY 8 HOURS PRN
Qty: 20 TABLET | Refills: 0 | Status: SHIPPED | OUTPATIENT
Start: 2023-10-11

## 2023-10-11 RX ORDER — CEFAZOLIN SODIUM 2 G/50ML
2000 SOLUTION INTRAVENOUS ONCE
Status: COMPLETED | OUTPATIENT
Start: 2023-10-11 | End: 2023-10-12

## 2023-10-11 RX ORDER — OXYCODONE HYDROCHLORIDE 5 MG/1
5 TABLET ORAL EVERY 4 HOURS PRN
Status: DISCONTINUED | OUTPATIENT
Start: 2023-10-11 | End: 2023-10-12 | Stop reason: HOSPADM

## 2023-10-11 RX ORDER — FENTANYL CITRATE/PF 50 MCG/ML
50 SYRINGE (ML) INJECTION
Status: DISCONTINUED | OUTPATIENT
Start: 2023-10-11 | End: 2023-10-11 | Stop reason: HOSPADM

## 2023-10-11 RX ORDER — TRANEXAMIC ACID 10 MG/ML
1000 INJECTION, SOLUTION INTRAVENOUS ONCE
Status: COMPLETED | OUTPATIENT
Start: 2023-10-11 | End: 2023-10-11

## 2023-10-11 RX ORDER — ACETAMINOPHEN 325 MG/1
975 TABLET ORAL EVERY 8 HOURS SCHEDULED
Status: DISCONTINUED | OUTPATIENT
Start: 2023-10-11 | End: 2023-10-12 | Stop reason: HOSPADM

## 2023-10-11 RX ORDER — CALCIUM CARBONATE 500 MG/1
1000 TABLET, CHEWABLE ORAL DAILY PRN
Status: DISCONTINUED | OUTPATIENT
Start: 2023-10-11 | End: 2023-10-12 | Stop reason: HOSPADM

## 2023-10-11 RX ORDER — TRAMADOL HYDROCHLORIDE 50 MG/1
50 TABLET ORAL EVERY 6 HOURS PRN
Qty: 40 TABLET | Refills: 0 | Status: SHIPPED | OUTPATIENT
Start: 2023-10-11 | End: 2023-10-21

## 2023-10-11 RX ORDER — EPHEDRINE SULFATE 50 MG/ML
INJECTION INTRAVENOUS AS NEEDED
Status: DISCONTINUED | OUTPATIENT
Start: 2023-10-11 | End: 2023-10-11

## 2023-10-11 RX ORDER — ROCURONIUM BROMIDE 10 MG/ML
INJECTION, SOLUTION INTRAVENOUS AS NEEDED
Status: DISCONTINUED | OUTPATIENT
Start: 2023-10-11 | End: 2023-10-11

## 2023-10-11 RX ADMIN — KETOROLAC TROMETHAMINE 30 MG: 30 INJECTION, SOLUTION INTRAMUSCULAR at 17:15

## 2023-10-11 RX ADMIN — GLYCOPYRROLATE 0.2 MG: 0.2 INJECTION, SOLUTION INTRAMUSCULAR; INTRAVENOUS at 15:00

## 2023-10-11 RX ADMIN — ACETAMINOPHEN 325MG 975 MG: 325 TABLET ORAL at 19:21

## 2023-10-11 RX ADMIN — GABAPENTIN 300 MG: 300 CAPSULE ORAL at 13:17

## 2023-10-11 RX ADMIN — ACETAMINOPHEN 325MG 975 MG: 325 TABLET ORAL at 13:13

## 2023-10-11 RX ADMIN — FENTANYL CITRATE 100 MCG: 50 INJECTION, SOLUTION INTRAMUSCULAR; INTRAVENOUS at 15:25

## 2023-10-11 RX ADMIN — CHLORHEXIDINE GLUCONATE 15 ML: 1.2 RINSE ORAL at 13:05

## 2023-10-11 RX ADMIN — CEFAZOLIN SODIUM 3000 MG: 10 INJECTION, POWDER, FOR SOLUTION INTRAVENOUS at 21:18

## 2023-10-11 RX ADMIN — FENTANYL CITRATE 100 MCG: 50 INJECTION, SOLUTION INTRAMUSCULAR; INTRAVENOUS at 16:30

## 2023-10-11 RX ADMIN — CEFAZOLIN SODIUM 3000 MG: 2 SOLUTION INTRAVENOUS at 14:58

## 2023-10-11 RX ADMIN — FENTANYL CITRATE 100 MCG: 50 INJECTION, SOLUTION INTRAMUSCULAR; INTRAVENOUS at 14:40

## 2023-10-11 RX ADMIN — ONDANSETRON 8 MG: 2 INJECTION INTRAMUSCULAR; INTRAVENOUS at 15:29

## 2023-10-11 RX ADMIN — TRANEXAMIC ACID 1000 MG: 10 INJECTION, SOLUTION INTRAVENOUS at 15:27

## 2023-10-11 RX ADMIN — PROPOFOL 300 MG: 10 INJECTION, EMULSION INTRAVENOUS at 14:52

## 2023-10-11 RX ADMIN — HYDROMORPHONE HYDROCHLORIDE 0.5 MG: 1 INJECTION, SOLUTION INTRAMUSCULAR; INTRAVENOUS; SUBCUTANEOUS at 16:32

## 2023-10-11 RX ADMIN — SODIUM CHLORIDE 125 ML/HR: 0.9 INJECTION, SOLUTION INTRAVENOUS at 20:31

## 2023-10-11 RX ADMIN — LIDOCAINE HYDROCHLORIDE 100 MG: 20 INJECTION, SOLUTION EPIDURAL; INFILTRATION; INTRACAUDAL at 14:52

## 2023-10-11 RX ADMIN — ROCURONIUM BROMIDE 50 MG: 10 INJECTION, SOLUTION INTRAVENOUS at 14:53

## 2023-10-11 RX ADMIN — SUGAMMADEX 200 MG: 100 INJECTION, SOLUTION INTRAVENOUS at 17:15

## 2023-10-11 RX ADMIN — SODIUM CHLORIDE 125 ML/HR: 0.9 INJECTION, SOLUTION INTRAVENOUS at 13:18

## 2023-10-11 RX ADMIN — EPHEDRINE SULFATE 10 MG: 50 INJECTION, SOLUTION INTRAVENOUS at 15:30

## 2023-10-11 RX ADMIN — EPHEDRINE SULFATE 10 MG: 50 INJECTION, SOLUTION INTRAVENOUS at 15:20

## 2023-10-11 NOTE — INTERVAL H&P NOTE
H&P reviewed. After examining the patient I find no changes in the patients condition since the H&P had been written.     Vitals:    10/11/23 1255   BP: 137/87   Pulse: 84   Resp: 16   Temp: 97.9 °F (36.6 °C)   SpO2: 94%

## 2023-10-11 NOTE — ANESTHESIA PREPROCEDURE EVALUATION
Procedure:  EXCISION BIOPSY TISSUE LESION/MASS KNEE (Right: Knee)    Relevant Problems   CARDIO   (+) Hypertension      Other   (+) Obesity        Physical Exam    Airway    Mallampati score: III  TM Distance: >3 FB  Neck ROM: full     Dental   No notable dental hx     Cardiovascular  Rhythm: regular, Rate: normal    Pulmonary   Breath sounds clear to auscultation    Other Findings        Anesthesia Plan  ASA Score- 2     Anesthesia Type- general with ASA Monitors. Additional Monitors:     Airway Plan:            Plan Factors-    Chart reviewed. Existing labs reviewed. Patient summary reviewed. Patient is not a current smoker. Patient instructed to abstain from smoking on day of procedure. Patient did not smoke on day of surgery. Obstructive sleep apnea risk education given perioperatively. Induction-     Postoperative Plan-     Informed Consent- Anesthetic plan and risks discussed with patient.

## 2023-10-11 NOTE — DISCHARGE INSTR - AVS FIRST PAGE
POSTOPERATIVE INSTRUCTIONS     MEDICATIONS:  Resume all home medications unless otherwise instructed by your surgeon. Pain Medication:  take as prescribed for breakthrough   If you were given a regional anesthetic (nerve block), please begin taking the pain medication as soon as you get home, even if you have minimal or no pain. DO NOT WAIT FOR THE NERVE BLOCK TO WEAR OFF. Possible side effects include nausea, constipation, and urinary retention. If you experience these side effects, please call our office for assistance. Pain med refills are authorized only during office hours (8am-4pm, Mon-Fri). Anti-Inflammatory:  take as prescribed   Take with food. Stop if you experience nausea, reflux, or stomach pain. Nausea Medication:  take as prescribed  Fill prescription ONLY if you expericnce severe nausea. Blood Clot Prevention:  Aspirin 81 mg, 1 tablet twice daily for 2 weeks  Pump your foot up and down 20 times per hour while you are less mobile. WOUND CARE:  Keep the dressing clean and dry. Light drainage may occur the first 2 days postop. DO NOT REMOVE THE DRESSINGS until you are seen in our office. Cover the bandages appropriately while washing to keep them from getting wet. Please call our office ([phone]) if you experience either of the following:  Sudden increase in swelling, redness, or warmth at the surgical site  Excessive incisional drainage that persists beyond the 3rd day after surgery  Oral temperature greater than 101 degrees, not relieved with Tylenol  Shortness of breath, chest pain, nausea, or any other concerning symptoms    SWELLING CONTROL:  Cold Therapy: The cold therapy device may be used either continuously or only as needed, according to your preference. Do not let the pad directly touch your skin. Alternatively, apply ice (20 min on, 20 min off) as often as you feel is necessary. Elevation:  Elevate the entire leg above heart level.   Place pillows under your ankle to keep your knee straight. Compression:  Apply ACE wraps or a thigh-length compression stocking as needed. RANGE OF MOTION:  You are allowed LIMITED RANGE OF MOTION at 40 degrees of flexion. IMMOBILIZATION:  Your knee brace should be WORN AND LOCKED AT ALL TIMES, including sleep. ACTIVITY:   DO NOT BEAR WEIGHT on the operative leg. Always use crutches. You may rest your foot on the ground for balance only. Using Crutches on Stairs:  Going up, lead with your "good" (nonoperative) leg. Going down, lead with your "bad" (operative) leg. Use a hand rail when available. Knee Extension:  Place a rolled towel or pillow under your ankle for 20-30 minutes 3-5 times per day. This will help to maintain full knee extension. Quad Sets:  Sit or lie with your knee straight. Tighten your quadriceps (front thigh) muscle. Hold for 3 seconds, then relax. Repeat 20 times per hour while awake. PHYSICAL THERAPY:  You will be given a physical therapy prescription when you are seen in the office for your postoperative appointment. FOLLOW-UP APPOINTMENT:  10-14 days after surgery with:    KARLY Mccain [facility]  [phone]

## 2023-10-11 NOTE — OP NOTE
OPERATIVE REPORT  PATIENT NAME: Nazario Terrell    :  1965  MRN: [id number]  Pt Location: [facility] OR ROOM 03    SURGERY DATE: 10/11/2023    Surgeon(s) and Role:     * Precious Peterson DO - Primary     * Walker Gan MD - Assisting     * Salma Del Angel PA-C - Assisting    Preop Diagnosis:  Mass of right knee [R22.41]    Post-Op Diagnosis Codes:     * Mass of right knee [R22.41]    Procedure(s):    Dissection mobilization popliteal vessels  Neuroplasty sciatic/tibial nerve  Excision of deep mass right knee 5 x 2 cm    Specimen(s):  ID Type Source Tests Collected by Time Destination   1 : Right Knee Mass Tissue Joint, Right Knee TISSUE EXAM Precious Peterson DO 10/11/2023 1721        Estimated Blood Loss:   50 mL    Drains:  * No LDAs found *    Anesthesia Type:   Choice    Operative Indications: Mass of right knee [R22.41]    Patient is a 58-year-old male who has significant right knee pain and effusions after an injury. MRI was taken demonstrating a mass posterior to his PCL the right knee. 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 I spoke with patient that this may be a benign mass such as that or even something such as a sarcoma and this may be causing his recurrent effusions and he should have it removed. The patient agreed. The patient has failed non operative measures and has opted for surgical intervention. Risks and benefits of the treatment options and surgery were discussed in detail with the patient by Dr. Brandon Lee. The risks of surgery including infection, bleeding, injury to nerves, injury to the vessels, excess scar tissue formation, risk of failure of the procedure, the possible need for further surgery, recurrent effusions, recurrence of mass ,and potential risk of loss of limb and life. After weighing all the treatment options available, the patient has opted for surgical intervention and informed consent was obtained.            Operative Findings:  Hemosiderin laden mass posterior right knee      Complications:   None    Procedure and Technique:    Patient was met in preop holding where the right lower extremity was identified as the proper operative extremity. Taken the operating room was transferred operating room table after he was intubated and sedated on the stretcher and then flipped prone onto his belly. All bony prominences well-padded with gel pads. Perioperative antibiotics were given, Ancef. Right lower extremities prepped and draped in standard sterile fashion. Timeout was performed identifying all team members in the room the right lower extremity the proper operative extremity. Shaped incision was made over the posterior knee from medial proximal to distal lateral.  Skin was sharply incised down to fascia. Fascia was meticulously dissected off the posterior knee. Peroneal nerve was found, common peroneal nerve and the lateral portion. This was dissected out cleanly and a vessel loop was placed around it. This was traced back to the sciatic nerve which this was dissected out cleanly. Once more the sciatic nerve was then found as it splits distally and the tibial nerve was identified, a vessel loop was placed around the tibial nerve as well. So a vessel loop was around both the tibial nerve and the common peroneal nerve these were dissected out cleanly from the posterior knee and taken laterally. Deep to the nerve and nerve structures, the neurovascular bundle was identified. The popliteal artery and vein were dissected out from proximal to distal and away from the deep tumor in order to mobilize them away in order to perform the surgery. Any deep investing vessels that were going towards the joint, likely geniculate vessel such as the middle geniculate were ligated. After appropriate exposure was obtained via dissecting and mobilizing the vessels away from the tumor. Deep retractors were placed.   The posterior knee joint capsule was identified and opened up with via a vertical arthrotomy at the middle portion of the posterior femoral notch. According to the MRI, the mass was just lateral to the medial femoral condyle. Capsule was elevated in this region, while vasculature was meticulously protected. Deep to this region, a mass with hemosiderin laden looking tissue was identified this was removed en bloc from the deep posterior joint. This was removed and measured on the back table to be approximately 5 x 2 and half centimeters. Small pieces of leftover tissue that were identified were also removed with a rongeur. This measurement and size of the mass was approximately equal to the volume of the mass found on the MRI and this is likely the only area of nodular tissue. The deep wound was copiously irrigated with normal saline. Vesseloops were removed. Distal pulse was palpated. Neurovascular structures were intact. Subcutaneous tissue was closed with 2-0 Monocryl. Skin was closed with a mixture of 3-0 Monocryl and nylon suture. Skin was cleansed and dried. Xeroform 4 x 4 soft roll Ace as well as large ABD pads were placed in the posterior knee in order to increase flexion. Patient was awoken from anesthesia without complication sent to the PACU     I was present for the entire procedure.     Patient Disposition:  PACU  and extubated and stable        SIGNATURE: Jon Ayala DO  DATE: October 11, 2023  TIME: 5:26 PM

## 2023-10-12 VITALS
DIASTOLIC BLOOD PRESSURE: 57 MMHG | WEIGHT: 300.05 LBS | OXYGEN SATURATION: 96 % | HEART RATE: 78 BPM | TEMPERATURE: 98.1 F | BODY MASS INDEX: 37.5 KG/M2 | SYSTOLIC BLOOD PRESSURE: 131 MMHG | RESPIRATION RATE: 20 BRPM

## 2023-10-12 LAB
ALBUMIN SERPL BCP-MCNC: 3.9 G/DL (ref 3.5–5)
ALP SERPL-CCNC: 58 U/L (ref 34–104)
ALT SERPL W P-5'-P-CCNC: 16 U/L (ref 7–52)
ANION GAP SERPL CALCULATED.3IONS-SCNC: 8 MMOL/L
AST SERPL W P-5'-P-CCNC: 11 U/L (ref 13–39)
BASOPHILS # BLD AUTO: 0.02 THOUSANDS/ÂΜL (ref 0–0.1)
BASOPHILS NFR BLD AUTO: 0 % (ref 0–1)
BILIRUB SERPL-MCNC: 0.46 MG/DL (ref 0.2–1)
BUN SERPL-MCNC: 15 MG/DL (ref 5–25)
CALCIUM SERPL-MCNC: 8.4 MG/DL (ref 8.4–10.2)
CHLORIDE SERPL-SCNC: 105 MMOL/L (ref 96–108)
CO2 SERPL-SCNC: 23 MMOL/L (ref 21–32)
CREAT SERPL-MCNC: 1.12 MG/DL (ref 0.6–1.3)
EOSINOPHIL # BLD AUTO: 0 THOUSAND/ÂΜL (ref 0–0.61)
EOSINOPHIL NFR BLD AUTO: 0 % (ref 0–6)
ERYTHROCYTE [DISTWIDTH] IN BLOOD BY AUTOMATED COUNT: 11.7 % (ref 11.6–15.1)
GFR SERPL CREATININE-BSD FRML MDRD: 71 ML/MIN/1.73SQ M
GLUCOSE SERPL-MCNC: 128 MG/DL (ref 65–140)
HCT VFR BLD AUTO: 42.8 % (ref 36.5–49.3)
HGB BLD-MCNC: 14.3 G/DL (ref 12–17)
IMM GRANULOCYTES # BLD AUTO: 0.03 THOUSAND/UL (ref 0–0.2)
IMM GRANULOCYTES NFR BLD AUTO: 0 % (ref 0–2)
LYMPHOCYTES # BLD AUTO: 0.91 THOUSANDS/ÂΜL (ref 0.6–4.47)
LYMPHOCYTES NFR BLD AUTO: 13 % (ref 14–44)
MCH RBC QN AUTO: 28.2 PG (ref 26.8–34.3)
MCHC RBC AUTO-ENTMCNC: 33.4 G/DL (ref 31.4–37.4)
MCV RBC AUTO: 84 FL (ref 82–98)
MONOCYTES # BLD AUTO: 0.37 THOUSAND/ÂΜL (ref 0.17–1.22)
MONOCYTES NFR BLD AUTO: 5 % (ref 4–12)
NEUTROPHILS # BLD AUTO: 5.52 THOUSANDS/ÂΜL (ref 1.85–7.62)
NEUTS SEG NFR BLD AUTO: 82 % (ref 43–75)
NRBC BLD AUTO-RTO: 0 /100 WBCS
PLATELET # BLD AUTO: 269 THOUSANDS/UL (ref 149–390)
PMV BLD AUTO: 9.7 FL (ref 8.9–12.7)
POTASSIUM SERPL-SCNC: 4.3 MMOL/L (ref 3.5–5.3)
PROT SERPL-MCNC: 7 G/DL (ref 6.4–8.4)
RBC # BLD AUTO: 5.07 MILLION/UL (ref 3.88–5.62)
SODIUM SERPL-SCNC: 136 MMOL/L (ref 135–147)
WBC # BLD AUTO: 6.85 THOUSAND/UL (ref 4.31–10.16)

## 2023-10-12 PROCEDURE — 99024 POSTOP FOLLOW-UP VISIT: CPT | Performed by: STUDENT IN AN ORGANIZED HEALTH CARE EDUCATION/TRAINING PROGRAM

## 2023-10-12 PROCEDURE — NC001 PR NO CHARGE: Performed by: STUDENT IN AN ORGANIZED HEALTH CARE EDUCATION/TRAINING PROGRAM

## 2023-10-12 PROCEDURE — 97162 PT EVAL MOD COMPLEX 30 MIN: CPT

## 2023-10-12 PROCEDURE — 85025 COMPLETE CBC W/AUTO DIFF WBC: CPT

## 2023-10-12 PROCEDURE — 97167 OT EVAL HIGH COMPLEX 60 MIN: CPT

## 2023-10-12 PROCEDURE — 80053 COMPREHEN METABOLIC PANEL: CPT

## 2023-10-12 RX ORDER — AMLODIPINE BESYLATE 10 MG/1
10 TABLET ORAL DAILY
Status: DISCONTINUED | OUTPATIENT
Start: 2023-10-12 | End: 2023-10-12 | Stop reason: HOSPADM

## 2023-10-12 RX ADMIN — AMLODIPINE BESYLATE 10 MG: 10 TABLET ORAL at 09:50

## 2023-10-12 RX ADMIN — SODIUM CHLORIDE 125 ML/HR: 0.9 INJECTION, SOLUTION INTRAVENOUS at 03:55

## 2023-10-12 RX ADMIN — ACETAMINOPHEN 325MG 975 MG: 325 TABLET ORAL at 05:24

## 2023-10-12 RX ADMIN — CEFAZOLIN SODIUM 3000 MG: 10 INJECTION, POWDER, FOR SOLUTION INTRAVENOUS at 05:24

## 2023-10-12 RX ADMIN — ASPIRIN 81 MG: 81 TABLET, COATED ORAL at 05:24

## 2023-10-12 NOTE — PLAN OF CARE
Problem: PAIN - ADULT  Goal: Verbalizes/displays adequate comfort level or baseline comfort level  Description: Interventions:  - Encourage patient to monitor pain and request assistance  - Assess pain using appropriate pain scale  - Administer analgesics based on type and severity of pain and evaluate response  - Implement non-pharmacological measures as appropriate and evaluate response  - Consider cultural and social influences on pain and pain management  - Notify physician/advanced practitioner if interventions unsuccessful or patient reports new pain  Outcome: Adequate for Discharge     Problem: INFECTION - ADULT  Goal: Absence or prevention of progression during hospitalization  Description: INTERVENTIONS:  - Assess and monitor for signs and symptoms of infection  - Monitor lab/diagnostic results  - Monitor all insertion sites, i.e. indwelling lines, tubes, and drains  - Monitor endotracheal if appropriate and nasal secretions for changes in amount and color  - Kirby appropriate cooling/warming therapies per order  - Administer medications as ordered  - Instruct and encourage patient and family to use good hand hygiene technique  - Identify and instruct in appropriate isolation precautions for identified infection/condition  Outcome: Adequate for Discharge     Problem: SAFETY ADULT  Goal: Patient will remain free of falls  Description: INTERVENTIONS:  - Educate patient/family on patient safety including physical limitations  - Instruct patient to call for assistance with activity   - Consult OT/PT to assist with strengthening/mobility   - Keep Call bell within reach  - Keep bed low and locked with side rails adjusted as appropriate  - Keep care items and personal belongings within reach  - Initiate and maintain comfort rounds  - Make Fall Risk Sign visible to staff  - Offer Toileting every 2 Hours, in advance of need  - Initiate/Maintain bed alarm  - Obtain necessary fall risk management equipment: cruches  - Apply yellow socks and bracelet for high fall risk patients  - Consider moving patient to room near nurses station  Outcome: Adequate for Discharge  Goal: Maintain or return to baseline ADL function  Description: INTERVENTIONS:  -  Assess patient's ability to carry out ADLs; assess patient's baseline for ADL function and identify physical deficits which impact ability to perform ADLs (bathing, care of mouth/teeth, toileting, grooming, dressing, etc.)  - Assess/evaluate cause of self-care deficits   - Assess range of motion  - Assess patient's mobility; develop plan if impaired  - Assess patient's need for assistive devices and provide as appropriate  - Encourage maximum independence but intervene and supervise when necessary  - Involve family in performance of ADLs  - Assess for home care needs following discharge   - Consider OT consult to assist with ADL evaluation and planning for discharge  - Provide patient education as appropriate  Outcome: Adequate for Discharge  Goal: Maintains/Returns to pre admission functional level  Description: INTERVENTIONS:  - Perform BMAT or MOVE assessment daily.   - Set and communicate daily mobility goal to care team and patient/family/caregiver. - Collaborate with rehabilitation services on mobility goals if consulted  - Perform Range of Motion 3 times a day. - Reposition patient every 2 hours.   - Dangle patient 3 times a day  - Stand patient 3 times a day  - Ambulate patient 3 times a day  - Out of bed to chair 3 times a day   - Out of bed for meals 3 times a day  - Out of bed for toileting  - Record patient progress and toleration of activity level   Outcome: Adequate for Discharge     Problem: DISCHARGE PLANNING  Goal: Discharge to home or other facility with appropriate resources  Description: INTERVENTIONS:  - Identify barriers to discharge w/patient and caregiver  - Arrange for needed discharge resources and transportation as appropriate  - Identify discharge learning needs (meds, wound care, etc.)  - Arrange for interpretive services to assist at discharge as needed  - Refer to Case Management Department for coordinating discharge planning if the patient needs post-hospital services based on physician/advanced practitioner order or complex needs related to functional status, cognitive ability, or social support system  Outcome: Adequate for Discharge     Problem: Knowledge Deficit  Goal: Patient/family/caregiver demonstrates understanding of disease process, treatment plan, medications, and discharge instructions  Description: Complete learning assessment and assess knowledge base.   Interventions:  - Provide teaching at level of understanding  - Provide teaching via preferred learning methods  Outcome: Adequate for Discharge     Problem: RESPIRATORY - ADULT  Goal: Achieves optimal ventilation and oxygenation  Description: INTERVENTIONS:  - Assess for changes in respiratory status  - Assess for changes in mentation and behavior  - Position to facilitate oxygenation and minimize respiratory effort  - Oxygen administered by appropriate delivery if ordered  - Initiate smoking cessation education as indicated  - Encourage broncho-pulmonary hygiene including cough, deep breathe, Incentive Spirometry  - Assess the need for suctioning and aspirate as needed  - Assess and instruct to report SOB or any respiratory difficulty  - Respiratory Therapy support as indicated  Outcome: Adequate for Discharge     Problem: MUSCULOSKELETAL - ADULT  Goal: Maintain or return mobility to safest level of function  Description: INTERVENTIONS:  - Assess patient's ability to carry out ADLs; assess patient's baseline for ADL function and identify physical deficits which impact ability to perform ADLs (bathing, care of mouth/teeth, toileting, grooming, dressing, etc.)  - Assess/evaluate cause of self-care deficits   - Assess range of motion  - Assess patient's mobility  - Assess patient's need for assistive devices and provide as appropriate  - Encourage maximum independence but intervene and supervise when necessary  - Involve family in performance of ADLs  - Assess for home care needs following discharge   - Consider OT consult to assist with ADL evaluation and planning for discharge  - Provide patient education as appropriate  Outcome: Adequate for Discharge  Goal: Maintain proper alignment of affected body part  Description: INTERVENTIONS:  - Support, maintain and protect limb and body alignment  - Provide patient/ family with appropriate education  Outcome: Adequate for Discharge

## 2023-10-12 NOTE — UTILIZATION REVIEW
Initial Clinical Review    Elective IP    surgical procedure    Age/Sex: 60 y.o. male    Surgery Date: 10/11/223    Procedure: Dissection mobilization popliteal vessels  Neuroplasty sciatic/tibial nerve  Excision of deep mass right knee 5 x 2 cm      Anesthesia: choice      Operative Findings:  Hemosiderin laden mass posterior right knee     POD#1 Progress Note:   10/12   D/C  home    Admission Orders: Date/Time/Statement:   Admission Orders (From admission, onward)       Ordered        10/11/23 1817  Inpatient Admission  Once                          Orders Placed This Encounter   Procedures    Inpatient Admission     Standing Status:   Standing     Number of Occurrences:   1     Order Specific Question:   Level of Care     Answer:   Med Surg [16]     Order Specific Question:   Estimated length of stay     Answer:   Inpatient Only Surgery     Vital Signs: /57 (BP Location: Left arm)   Pulse 78   Temp 98.1 °F (36.7 °C) (Temporal)   Resp 20   Wt (!) 136 kg (300 lb 0.7 oz)   SpO2 96%   BMI 37.50 kg/m²     Pertinent Labs/Diagnostic Test Results:   XR knee 1 or 2 vw right   Final Result by Jacob Martin MD (10/12 7493)      Fluoroscopic guidance provided for procedure guidance. Please refer to the separate procedure notes for additional details.             Workstation performed: [id number]               Results from last 7 days   Lab Units 10/12/23  0444   WBC Thousand/uL 6.85   HEMOGLOBIN g/dL 14.3   HEMATOCRIT % 42.8   PLATELETS Thousands/uL 269   NEUTROS ABS Thousands/µL 5.52         Results from last 7 days   Lab Units 10/12/23  0444   SODIUM mmol/L 136   POTASSIUM mmol/L 4.3   CHLORIDE mmol/L 105   CO2 mmol/L 23   ANION GAP mmol/L 8   BUN mg/dL 15   CREATININE mg/dL 1.12   EGFR ml/min/1.73sq m 71   CALCIUM mg/dL 8.4     Results from last 7 days   Lab Units 10/12/23  0444   AST U/L 11*   ALT U/L 16   ALK PHOS U/L 58   TOTAL PROTEIN g/dL 7.0   ALBUMIN g/dL 3.9   TOTAL BILIRUBIN mg/dL 0.46 Results from last 7 days   Lab Units 10/12/23  0444   GLUCOSE RANDOM mg/dL 128                   Diet: regular    Mobility: PT/OT    DVT Prophylaxis: aspirin      Medications/Pain Control:   Scheduled Medications:  acetaminophen, 975 mg, Oral, Q8H DONTA  aspirin, 81 mg, Oral, BID  nicotine, 1 patch, Transdermal, Daily  senna, 1 tablet, Oral, Daily      Continuous IV Infusions:  sodium chloride, 125 mL/hr, Intravenous, Continuous      PRN Meds:  calcium carbonate, 1,000 mg, Oral, Daily PRN  lactated ringers, 1,000 mL, Intravenous, Once PRN   And  lactated ringers, 1,000 mL, Intravenous, Once PRN  ondansetron, 4 mg, Intravenous, Q6H PRN  oxyCODONE, 10 mg, Oral, Q4H PRN  oxyCODONE, 5 mg, Oral, Q4H PRN  sodium chloride, 1,000 mL, Intravenous, Once PRN   And  sodium chloride, 1,000 mL, Intravenous, Once PRN        Network Utilization Review Department  ATTENTION: Please call with any questions or concerns to [phone] and carefully listen to the prompts so that you are directed to the right person. All voicemails are confidential.   For Discharge needs, contact Care Management DC Support Team at [phone] opt. 2  Send all requests for admission clinical reviews, approved or denied determinations and any other requests to dedicated fax number below belonging to the campus where the patient is receiving treatment.  List of dedicated fax numbers for the Facilities:  [place] DENIALS (Administrative/Medical Necessity) [phone]   DISCHARGE SUPPORT TEAM (NETWORK) [phone]   [address] [facility] (Maternity/NICU/Pediatrics) [phone]   [address] [facility] [address] [facility] [address] [facility] [address] [facility] 1000 [facility] [place] [address] [phone]

## 2023-10-12 NOTE — PHYSICAL THERAPY NOTE
PHYSICAL THERAPY EVALUATION          Patient Name: Michele Mendoza  TWLNQ'H Date: 10/12/2023       10/12/23 7384   PT Last Visit   PT Visit Date 10/12/23   Note Type   Note type Evaluation   Pain Assessment   Pain Assessment Tool 0-10   Pain Score 2   Pain Location/Orientation Orientation: Right;Location: Leg   Hospital Pain Intervention(s) Repositioned; Ambulation/increased activity; Emotional support   Restrictions/Precautions   Weight Bearing Precautions Per Order Yes   RLE Weight Bearing Per Order TTWB   Braces or Orthoses Knee brace  (HKB locked in 40 degrees)   Other Precautions WBS; Multiple lines; Fall Risk;Pain   Home Living   Type of [address] [place] [address] One level;Stairs to enter with rails   Bathroom Shower/Tub Tub/shower unit   Bathroom Toilet Standard   Bathroom Equipment Commode   Home Equipment Quad cane   Additional Comments 3 MARINA w LHR   Prior Function   Level of [facility] Independent with ADLs; Independent with functional mobility; Independent with IADLS   Lives With Spouse   IADLs Independent with driving   Falls in the last 6 months 1 to 4   Vocational Full time employment   Comments spouse to be home for x1 week then can work from home prn   General   Additional Pertinent History pt admitted 10/11/23 for mass of R knee now s/p excision. TTWB in HKB locked in 40* per ortho.    Cognition   Overall Cognitive Status WFL   Arousal/Participation Cooperative   Orientation Level Oriented X4   Memory Within functional limits   Following Commands Follows all commands and directions without difficulty   RLE Assessment   RLE Assessment X  (hip strength wfl, unable to assess distal dt brace)   LLE Assessment   LLE Assessment WFL   Coordination   Sensation WFL   Bed Mobility   Supine to Sit 6  Modified independent   Additional items Bedrails   Transfers   Sit to Stand 6  Modified independent   Additional items Bedrails;Armrests   Stand to Sit 6  Modified independent   Additional items Armrests; Verbal cues   Additional Comments cues for technique w AD   Ambulation/Elevation   Gait pattern WNL; Excessively slow  (mild unsteady w crutches)   Gait Assistance 5  Supervision  (mod I w RW, S w crutches)   Additional items Assist x 1   Assistive Device Rolling walker; Axillary crutches   Distance 10' w RW, 15' w crutches   Stair Management Assistance Not tested  (decline need to trial; verbally reviewed technique)   Balance   Static Standing Fair   Dynamic Standing Fair -   Ambulatory Fair -   Endurance Deficit   Endurance Deficit No   Activity Tolerance   Activity Tolerance Patient tolerated treatment well   Medical Staff [facility] OT; Devan TRAN   Nurse Made Aware Andreina Cui RN   Assessment   Prognosis Good   Assessment Angela Reynolds is a 60 y.o. male admitted to [address] [place] [address] on 10/11/2023 for Mass of right knee. POD#1 excision. PT was consulted and pt was seen on 10/12/2023 for mobility assessment and d/c planning. Pt presents w TTWB and HKB in 36* per ortho, multiple lines, fall risk, acute pain. At baseline is indep without an AD. Pt is currently functioning at a modified independent assistance level for bed mobility and transfers, S for ambulation w AD. Pt demonstrated good compliance w WBS. Noted mild increase in unsteadiness w use of crutches vs RW however no gross LOB. Vitals stable on RA (96%). Decline need for further ambulation or stair trial. Deny concerns w dc home. At this time PT recommendations for d/c are home.    Barriers to Discharge None   Plan   PT Frequency   (d/c PT: pt to be dc. maintain on restorative)   Discharge Recommendation   PT Discharge Recommendation No rehabilitation needs   Equipment Recommended Crutches  (provided on IE)   AM-PAC Basic Mobility Inpatient   Turning in Flat Bed Without Bedrails 4   Lying on Back to Sitting on Edge of Flat Bed Without Bedrails 4   Moving Bed to Chair 4   Standing Up From Chair Using Arms 4   Walk in Room 3   Climb 3-5 Stairs With Railing 3   Basic Mobility Inpatient Raw Score 22   Basic Mobility Standardized Score 47.4   Highest Level Of Mobility   JH-HLM Goal 7: Walk 25 feet or more   JH-HLM Achieved 6: Walk 10 steps or more   End of Consult   Patient Position at End of Consult Bedside chair; All needs within reach   History: co - morbidities including age (obesity), current experience including fall risk, multiple lines, wbs, bracing  Exam: impairments in systems including multiple body structures involved; neuromuscular (balance, gait, transfers, sensation), cardiopulmonary (vitals), am-pac  Clinical: stable/unpredictable  Complexity: moderate

## 2023-10-12 NOTE — CASE MANAGEMENT
Case Management Progress Note    Patient name Laura Avila  Location [facility] 2 /E2 -* MRN [id number]  : 1965 Date 10/12/2023       LOS (days): 1  Geometric Mean LOS (GMLOS) (days):   Days to [facility]:        OBJECTIVE:        Current admission status: Inpatient  Preferred Pharmacy:   [facility], [facility] [address]  [facility] [address]  Phone: [phone] Fax: [address] [place] [address] #[id number] - [address]  [address] [place] [address] Drive [address]  Phone: [phone] Fax: [phone]    [address] [place] [address] - [address] [facility],  [facility],  [facility]  Phone: [phone] Fax: [phone]    Primary Care Provider: Chano Lara MD    Primary Insurance: WORKERS COMPENSATION  Secondary Insurance: BLUE CROSS    PROGRESS NOTE:    CM consulted by orthopedics. Pt is s/p tumor removal of posterior knee. Per PT assessment, pt resides in a one story home with spouse and was independent with ADLs prior to admission. Pt was provided crutched by PT. Therapy reporting no rehabilitation needs. No CM needs identified at this time. CM will continue to follow.

## 2023-10-12 NOTE — DISCHARGE SUMMARY
Discharge Summary - Orthopedics   Laura Avila 60 y.o. male MRN: [id number]  Unit/Bed#: E2 -01    Attending Physician: Dr. Lucie Manning    Admitting diagnosis: Mass of right knee [R22.41]    Discharge diagnosis: Mass of right knee [R22.41]    Date of admission: 10/11/2023    Date of discharge: 10/12/23    Procedure: Removal tumor posterior knee, dissection of vessels     HPI: 60 y.o. male with a history of knee pain and effusion with mass on MRI who has been seen by Dr. Kiki Barajas in clinic. Pt has failed previous non operative therapies and was scheduled for removal of mass. Prior to surgery the risks and benefits of surgery were explained and informed consent was obtained. Hospital course: Pt was taken to the OR on 10/11/23. Surgery went without complications and pt was discharged to the PACU in a stable condition and was transferred to the floor. On discharge date, pt was cleared by PT and the medicine team and determined to be safe for discharge. Daily discussion was had with the patient, nursing staff, orthopaedic team, and family members if present. All questions were answered to the patients satisifaction. Patient seen and evaluated by Dr. Kiki Barajas morning of discharge, allowing for same-day discharge protocol      0   Lab Value Date/Time    HGB 14.3 10/12/2023 0444    HGB 15.1 10/03/2023 0911       Discharge Instructions:   TTWB to RLE in TROM at 40 degrees  Keep dressings clean and dry at all times   Complete DVT prophylaxis as prescribed   Physical therapy  Body mass index is 37.5 kg/m². Follow-up as scheduled, otherwise call for appt. Discharge Medications: For the complete list of discharge medications, please refer to the patient's medication reconciliation.       [facility] [place]

## 2023-10-12 NOTE — UTILIZATION REVIEW
Notification of Unplanned, Urgent, or   Emergency Inpatient Admission   [address] [facility], [facility]  Tax ID: [id number]  NPI: [id number]  [address]: [address]  Admission Level of Care: Inpatient  Place of Service Code: 21     Attending Physician Information  Attending Name and NPI#: Osmany Arguello [[id number]]  Phone: [phone]     Admission Information  Inpatient Admission Date/Time: 10/11/23  6:17 PM  Discharge Date/Time: No discharge date for patient encounter. Admitting Diagnosis Code/Description:  Mass of right knee [R22.41]     Utilization Review Contact  Julius Lipscomb Utilization   Phone: [phone]  Fax: [phone]  Email: [email or web address]  Contact for approvals/pending authorizations, clinical reviews, and discharge. Physician Advisory Services Contact  Medical Necessity Denial & Zxnr-ef-[place] Discussion  Phone: [phone]  Fax: [phone]  Email: [email or web address]     DISCHARGE SUPPORT TEAM:  For Patients Discharge Needs & Updates  Phone: [phone] opt. 2 Fax: [phone]  Email: [email or web address]

## 2023-10-12 NOTE — OCCUPATIONAL THERAPY NOTE
Occupational Therapy Evaluation     Patient Name: Matias Gomes  TVISP'Q Date: 10/12/2023  Problem List  Principal Problem: Mass of right knee  Active Problems:    Obesity    Past Medical History  Past Medical History:   Diagnosis Date    Hypertension      Past Surgical History  Past Surgical History:   Procedure Laterality Date    MI ARTHROTOMY W/SYNOVECTOMY KNEE ANTERIOR/POSTERIOR Right 10/11/2023    Procedure: EXCISION BIOPSY TISSUE LESION/MASS KNEE;  Surgeon: Eden Clark DO;  Location: [facility];  Service: Orthopedics    SHOULDER SURGERY      Bone spur removal         10/12/23 0907   OT Last Visit   OT Visit Date 10/12/23   Note Type   Note type Evaluation   Pain Assessment   Pain Assessment Tool 0-10   Pain Score 2   Pain Location/Orientation Orientation: Right;Location: Leg   Hospital Pain Intervention(s) Repositioned; Ambulation/increased activity; Emotional support   Restrictions/Precautions   Weight Bearing Precautions Per Order Yes   RLE Weight Bearing Per Order TTWB   Braces or Orthoses Knee brace  (HKB locked in 40 degrees)   Other Precautions Multiple lines;WBS;Fall Risk;Pain   Home Living   Type of [address] [place] [address] One level;Stairs to enter with rails  (3 MARINA c L HR)   Bathroom Shower/Tub Tub/shower unit   Bathroom Toilet Standard   Bathroom Equipment Commode   Home Equipment Quad cane   Prior Function   Level of [place] Independent with ADLs; Independent with functional mobility; Independent with IADLS   Lives With Spouse   IADLs Independent with driving; Independent with meal prep; Independent with medication management   Falls in the last 6 months 1 to 4   Vocational Full time employment   Comments spouse to be home for x1 week then can work from home prn   Subjective   Subjective "I think I'm leaving today"   ADL   Where Assessed Edge of bed   Eating Assistance 7  Independent   Grooming Assistance 6  [address] [place] 7  Independent   LB Bathing Assistance 4  Minimal Assistance   UB Dressing Assistance 7  Independent   LB Dressing Assistance 4  Minimal Assistance   Toileting Assistance  5  Supervision/Setup   Bed Mobility   Supine to Sit 6  Modified independent   Additional items HOB elevated; Bedrails   Transfers   Sit to Stand 6  Modified independent   Additional items Bedrails;Armrests   Stand to Sit 6  Modified independent   Additional items Armrests   Functional Mobility   Functional Mobility 5  Supervision   Additional items Rolling walker   Balance   Static Sitting Good   Dynamic Sitting Fair +   Static Standing Fair   Dynamic Standing Fair -   Ambulatory Fair -   Activity Tolerance   Activity Tolerance Patient tolerated treatment well   Medical Staff Made Aware Nithya PT, ortho PA   Nurse Made Aware SHERYL Yost   RUE Assessment   RUE Assessment WFL   LUE Assessment   LUE Assessment WFL   Hand Function   Gross Motor Coordination Functional   Fine Motor Coordination Functional   Sensation   Light Touch No apparent deficits   Proprioception   Proprioception No apparent deficits   Vision-Basic Assessment   Current Vision No visual deficits   Vision - Complex Assessment   Ocular Range of Motion Intact   Perception   Inattention/Neglect Appears intact   Cognition   Overall Cognitive Status WFL   Arousal/Participation Alert; Responsive; Cooperative   Attention Within functional limits   Orientation Level Oriented X4   Memory Within functional limits   Following Commands Follows all commands and directions without difficulty   Assessment   Assessment Patient is a 60y.o. year old male seen for OT eval s/p admit to [facility] on 10/11/2023 with mass of R knee s/p Removal tumor posterior knee, dissection of vessels - TTWB in HKB locked in 40 degrees. Comorbidities affecting pt’s functional performance include a significant PMH of: shoulder joint pain, HTN. Patient with active OT orders and activity orders for Up with assistance.  Personal factors affecting pt at time of IE include: difficulty performing ADLs and IADLs, difficulty with functional mobility/transfers. Prior to admission, was (I) with ADLs and was (I) with IADLs. Patient lives in a ranch style home w/ 3 MARINA, L HR. Tub/shower and standard toilet. Spouse will be staying home x1 week and able to [facility] after if needed. Spouse did obtain BSC and quad cane for pt. Utilized BRW during evaluation however PT to administer crutches due to limited space in the home - refer to PT note. Educated pt on [facility] (how to adjust height, where to place, use for shower if shower it big enough), as well as offered education on tub bench vs shower chair. Briefly provided education on LHAE given impaired functional reach however pt has (A) at home thus declined at this time. Upon evaluation, patient’s functional status as follows: eating: Independent, grooming: Lamberto, UB bathing: Independent, LB bathing: Zulma, UB dressing: Independent, LB dressing: Zulma, toileting: supervision ; functional transfers: Lamberto, bed mobility: Lamberto, functional mobility: supervision , sitting/standing tolerance: ~5 min w/ b/l UE support. Given pt performing close to baseline and after education, rohit's good carryover w/ WBS and has received education on appropriate DME/AD/LHAE, will D/C from OT caseload at this time and maintain on restorative. PT to gait train w/ crutches. Pt denies questions at this time, left in care of PT.    Plan   OT Frequency Eval only   Discharge Recommendation   OT Discharge Recommendation No rehabilitation needs   AM-PAC Daily Activity Inpatient   Lower Body Dressing 3   Bathing 3   Toileting 3   Upper Body Dressing 4   Grooming 4   Eating 4   Daily Activity Raw Score 21   Daily Activity Standardized Score (Calc for Raw Score >=11) 44.27   AM-PAC Applied Cognition Inpatient   Following a Speech/Presentation 4   Understanding Ordinary Conversation 4   Taking Medications 4   Remembering Where Things Are Placed or Put Away 4   Remembering List of 4-5 Errands 4   Taking Care of Complicated Tasks 4   Applied Cognition Raw Score 24   Applied Cognition Standardized Score 62.21     [facility]

## 2023-10-12 NOTE — PLAN OF CARE
Problem: PAIN - ADULT  Goal: Verbalizes/displays adequate comfort level or baseline comfort level  Description: Interventions:  - Encourage patient to monitor pain and request assistance  - Assess pain using appropriate pain scale  - Administer analgesics based on type and severity of pain and evaluate response  - Implement non-pharmacological measures as appropriate and evaluate response  - Consider cultural and social influences on pain and pain management  - Notify physician/advanced practitioner if interventions unsuccessful or patient reports new pain  Outcome: Progressing     Problem: INFECTION - ADULT  Goal: Absence or prevention of progression during hospitalization  Description: INTERVENTIONS:  - Assess and monitor for signs and symptoms of infection  - Monitor lab/diagnostic results  - Monitor all insertion sites, i.e. indwelling lines, tubes, and drains  - Monitor endotracheal if appropriate and nasal secretions for changes in amount and color  - [place] appropriate cooling/warming therapies per order  - Administer medications as ordered  - Instruct and encourage patient and family to use good hand hygiene technique  - Identify and instruct in appropriate isolation precautions for identified infection/condition  Outcome: Progressing     Problem: RESPIRATORY - ADULT  Goal: Achieves optimal ventilation and oxygenation  Description: INTERVENTIONS:  - Assess for changes in respiratory status  - Assess for changes in mentation and behavior  - Position to facilitate oxygenation and minimize respiratory effort  - Oxygen administered by appropriate delivery if ordered  - Initiate smoking cessation education as indicated  - Encourage broncho-pulmonary hygiene including cough, deep breathe, Incentive Spirometry  - Assess the need for suctioning and aspirate as needed  - Assess and instruct to report SOB or any respiratory difficulty  - Respiratory Therapy support as indicated  Outcome: Progressing     Problem: MUSCULOSKELETAL - ADULT  Goal: Maintain or return mobility to safest level of function  Description: INTERVENTIONS:  - Assess patient's ability to carry out ADLs; assess patient's baseline for ADL function and identify physical deficits which impact ability to perform ADLs (bathing, care of mouth/teeth, toileting, grooming, dressing, etc.)  - Assess/evaluate cause of self-care deficits   - Assess range of motion  - Assess patient's mobility  - Assess patient's need for assistive devices and provide as appropriate  - Encourage maximum independence but intervene and supervise when necessary  - Involve family in performance of ADLs  - Assess for home care needs following discharge   - Consider OT consult to assist with ADL evaluation and planning for discharge  - Provide patient education as appropriate  Outcome: Progressing

## 2023-10-12 NOTE — ANESTHESIA POSTPROCEDURE EVALUATION
Post-Op Assessment Note    CV Status:  Stable    Pain management: adequate     Mental Status:  Alert and awake   Hydration Status:  Euvolemic   PONV Controlled:  Controlled   Airway Patency:  Patent      Post Op Vitals Reviewed: Yes      Staff: Anesthesiologist         No notable events documented.     BP      Temp      Pulse     Resp      SpO2      /78   Pulse 85   Temp 98 °F (36.7 °C) (Temporal)   Resp 18   Wt (!) 136 kg (300 lb 0.7 oz)   SpO2 95%   BMI 37.50 kg/m²

## 2023-10-12 NOTE — PROGRESS NOTES
Progress Note - Orthopedics   Chu Fajardo 60 y.o. male MRN: [id number]  Unit/Bed#: E2 -01      Subjective:    60 y.o.male POD 1 Right knee dissection with mobilization of popliteal vessels, neuroplasty sciatic/tibial nerve, and excision mass. No acute overnight events, no new complaints. Pain very well-controlled at a 2/10. He says that prior to surgery, his pain was a 5-6/10, so he feels better now. He denies fevers, chills, headaches, CP, SOB, N/V, or numbness or tingling. He is looking forward to going home.     Labs:  0   Lab Value Date/Time    HCT 42.8 10/12/2023 0444    HCT 45.7 10/03/2023 0911    HGB 14.3 10/12/2023 0444    HGB 15.1 10/03/2023 0911    INR 1.05 10/03/2023 0911    WBC 6.85 10/12/2023 0444    WBC 6.36 10/03/2023 0911       Meds:    Current Facility-Administered Medications:     acetaminophen (TYLENOL) tablet 975 mg, 975 mg, Oral, Q8H [address], Loretta Handley PA-C, 975 mg at 10/12/23 0524    amLODIPine (NORVASC) tablet 10 mg, 10 mg, Oral, Daily, Loretta Handley PA-C    aspirin (ECOTRIN LOW STRENGTH) EC tablet 81 mg, 81 mg, Oral, BID, Isak Handley PA-C, 81 mg at 10/12/23 0258    calcium carbonate (TUMS) chewable tablet 1,000 mg, 1,000 mg, Oral, Daily PRN, Loretta Handley PA-C    lactated ringers bolus 1,000 mL, 1,000 mL, Intravenous, Once PRN **AND** lactated ringers bolus 1,000 mL, 1,000 mL, Intravenous, Once PRN, Loretta Handley PA-C    nicotine (NICODERM CQ) 7 mg/24hr TD 24 hr patch 1 patch, 1 patch, Transdermal, Daily, Loretta Handley PA-C    ondansetron (ZOFRAN) injection 4 mg, 4 mg, Intravenous, Q6H PRN, Loretta Handley PA-C    oxyCODONE (ROXICODONE) immediate release tablet 10 mg, 10 mg, Oral, Q4H PRN, Loretta Handley PA-C    oxyCODONE (ROXICODONE) IR tablet 5 mg, 5 mg, Oral, Q4H PRN, Loretta Handley PA-C    senna (SENOKOT) tablet 8.6 mg, 1 tablet, Oral, Daily, Loretta Handley PA-C    sodium chloride 0.9 % bolus 1,000 mL, 1,000 mL, Intravenous, Once PRN **AND** sodium chloride 0.9 % bolus 1,000 mL, 1,000 mL, Intravenous, Once PRN, Loretta Handley PA-C    sodium chloride 0.9 % infusion, 125 mL/hr, Intravenous, Continuous, Loretta Handley PA-C, Last Rate: 125 mL/hr at 10/12/23 0355, 125 mL/hr at 10/12/23 0355    Blood Culture:   No results found for: "BLOODCX"    Wound Culture:   No results found for: "WOUNDCULT"    Ins and Outs:  I/O last 24 hours: In: 1900 [I.V.:1900]  Out: 975 [Urine:925; Blood:50]          Physical:  Vitals:    10/12/23 0605   BP: 131/57   Pulse: 78   Resp: 20   Temp: 98.1 °F (36.7 °C)   SpO2: 96%     Musculoskeletal: right Lower Extremity  Dressing C/D/I  Mild soreness with palpation of the knee. Sensation intact over the toes. Motor intact to +FHL/EHL, +ankle dorsi/plantar flexion  2+ DP pulse  Digits warm and well perfused  Capillary refill < 2 seconds    Assessment:    58 y.o.male POD 1 Right knee dissection with mobilization of popliteal vessels, neuroplasty sciatic/tibial nerve, and excision mass with Dr. Edmund Villegas 10/11. Plan:  TTWB RLE in TROM locked at 40 degrees of flexion. Ancef x 24 hours  Will monitor for ABLA and administer IVF/prbc as indicated for greater than 2 gram Hgb drop or Hgb < 7. Hgb this morning is 14.3. No signs of active bleeding in the operative extremity. Ortho will monitor daily while in house. Pain control  DVT ppx with aspirin 81 mg BID  Dispo: Ok for discharge from ortho perspective. Patient likely to be discharged early this afternoon.   Follow up with dr. Edmund Villegas in the clinic    [facility]

## 2023-10-13 NOTE — UTILIZATION REVIEW
NOTIFICATION OF ADMISSION DISCHARGE   This is a Notification of Discharge from [address]. Please be advised that this patient has been discharge from our facility. Below you will find the admission and discharge date and time including the patient’s disposition. UTILIZATION REVIEW CONTACT:  Meredith Ness  Utilization   Network Utilization Review Department  Phone: [phone] x carefully listen to the prompts. All voicemails are confidential.  Email: [email or web address]     ADMISSION INFORMATION  PRESENTATION DATE: 10/11/2023 12:28 PM  OBERVATION ADMISSION DATE:   INPATIENT ADMISSION DATE: 10/11/23  6:17 PM   DISCHARGE DATE: 10/12/2023 12:48 PM   DISPOSITION:Home/Self Care    Network Utilization Review Department  ATTENTION: Please call with any questions or concerns to [phone] and carefully listen to the prompts so that you are directed to the right person. All voicemails are confidential.   For Discharge needs, contact Care Management DC Support Team at [phone] opt. 2  Send all requests for admission clinical reviews, approved or denied determinations and any other requests to dedicated fax number below belonging to the campus where the patient is receiving treatment.  List of dedicated fax numbers for the Facilities:  [place] DENIALS (Administrative/Medical Necessity) [phone]   DISCHARGE SUPPORT TEAM (Network) [phone] [address] [facility] (Maternity/NICU/Pediatrics) [phone]   [address] [facility] [address] [phone] [id number] [facility] [phone]   [address] [place] [address]  [address]  [phone]

## 2023-10-16 PROCEDURE — 88307 TISSUE EXAM BY PATHOLOGIST: CPT | Performed by: PATHOLOGY

## 2023-10-26 ENCOUNTER — OFFICE VISIT (OUTPATIENT)
Dept: OBGYN CLINIC | Facility: MEDICAL CENTER | Age: 58
End: 2023-10-26

## 2023-10-26 VITALS
HEART RATE: 104 BPM | WEIGHT: 300 LBS | HEIGHT: 75 IN | BODY MASS INDEX: 37.3 KG/M2 | SYSTOLIC BLOOD PRESSURE: 156 MMHG | DIASTOLIC BLOOD PRESSURE: 97 MMHG

## 2023-10-26 DIAGNOSIS — Z47.89 AFTERCARE FOLLOWING SURGERY OF THE MUSCULOSKELETAL SYSTEM: Primary | ICD-10-CM

## 2023-10-26 PROCEDURE — 99024 POSTOP FOLLOW-UP VISIT: CPT | Performed by: STUDENT IN AN ORGANIZED HEALTH CARE EDUCATION/TRAINING PROGRAM

## 2023-10-26 NOTE — LETTER
October 26, 2023     Patient: Steven Nash  YOB: 1965  Date of Visit: 10/26/2023      To Whom it May Concern:    Steven Nash is under my professional care. Hermila Saeed was seen in my office on 10/26/2023. Hermila Saeed will remain out of work for 4 weeks. Re-evaluation in 4 weeks. If you have any questions or concerns, please don't hesitate to call.          Sincerely,          Ermias Manning, DO

## 2023-10-26 NOTE — PROGRESS NOTES
Orthopedic Oncology Post Operative Office Note  Ness Cutler (24 y.o. male)   : 1965   MRN: [id number]   Encounter Date: 10/26/2023  Dr. Luis F Quesada DO, Orthopedic Surgeon  Orthopedic Oncology & Sarcoma Surgery   DOS: 10/11/2023  Procedure performed: Excision Biopsy Tissue Lesion/mass Knee - Right      Impression/Plan: 60 y.o. male  2 weeks s/p Excision Biopsy Tissue Lesion/mass Knee - Right   Final pathology consistent with nodular PVNS. S/p Excision of mass of posterior right knee    Wound Care   OK to shower., No soaking or bathing for another 2 weeks. Pain control: PRN  Continue ice packs/elevation  Can continue compression with ACE wrap or compression stockings  Physical therapy: Not indicated at this time  WBAT to right lower extremity  Sling/Immobilizer/Brace: May discontinue as tolerated  Complete DVT ppx  Work note provided stating he will remain out of work until re-evaluation in 4 weeks. It is my professional opinion that the patient injured his knee while at work and thus aggravated a lesion that may have been dormant within his knee for a long period of time. He likely had this lesion for very long time and his injury at work brought this to life and exacerbated its issues. Return in about 4 weeks (around 2023). for evaluation without x-ray    2. Right knee pain  -due to injury and history of degenerative changes, may have chronic knee pain following removal of lesion as discussed prior to surgery       Subjective:  60 y.o. male 2 weeks s/p Excision Biopsy Tissue Lesion/mass Knee - Right  DOS: Excision Biopsy Tissue Lesion/mass Knee - Right     Pain/Complaints: None at this time, does not require any pain medication at this time. Numbness/weakness extremity: None    Physical Therapy Progress: Not indicated at this time   DVT ppx: ASA 81mg BID x 2 weeks   Abx: N/A   Eating/Drinking improving. Bowel/Bladder:  WNL   Denies fever/chills, numbness/tingling, injury/trauma, night sweats    Physical Exam:  Height: 6' 3" (190.5 cm)  Weight - Scale: 136 kg (300 lb)  BMI (Calculated): 37.5  BSA (Calculated - m2): 2.61 sq meters  Pain Assessment  Pain Loc: Knee     Vitals:    10/26/23 0847   BP: 156/97   Pulse: 104     Body mass index is 37.5 kg/m². General: alert and oriented x 3; well nourished/well developed; no apparent distress. Drains:  No drain    Extremity: Right knee -   mild swelling throughout  Surgical site:  clean, dry, and intact. Sutures removed. Steri strips applied  Sensation: intact   Motor: EHL/FHL/DF/PF  Brisk cap refill  Calf: bilateral calves soft, nontender    Labs:   Lab Results   Component Value Date    WBC 6.85 10/12/2023    HGB 14.3 10/12/2023    HCT 42.8 10/12/2023    MCV 84 10/12/2023     10/12/2023       Pathology: FINAL  Final Diagnosis   A. Soft tissue, "Right knee mass," Resection:  - Tenosynovial giant cell tumor, localized-type     Microbiology:  None    Imaging:   No new imaging obtained today    Oncology History    No history exists.        Scribe Attestation      I,:  Sammi Mohamud am acting as a scribe while in the presence of the attending physician.:       I,:  Collin Alvarenga DO personally performed the services described in this documentation    as scribed in my presence.:            Dr. Barbie Paul DO, Orthopedic Surgeon  Orthopedic Oncology & Sarcoma Surgery   Phone:[phone] PKS:[phone]

## 2023-11-30 ENCOUNTER — TELEPHONE (OUTPATIENT)
Age: 58
End: 2023-11-30

## 2023-11-30 ENCOUNTER — OFFICE VISIT (OUTPATIENT)
Dept: OBGYN CLINIC | Facility: MEDICAL CENTER | Age: 58
End: 2023-11-30

## 2023-11-30 VITALS
BODY MASS INDEX: 38.67 KG/M2 | HEIGHT: 75 IN | WEIGHT: 311 LBS | DIASTOLIC BLOOD PRESSURE: 89 MMHG | SYSTOLIC BLOOD PRESSURE: 132 MMHG | HEART RATE: 94 BPM

## 2023-11-30 DIAGNOSIS — M23.91 INTERNAL DERANGEMENT OF KNEE JOINT, RIGHT: Primary | ICD-10-CM

## 2023-11-30 PROCEDURE — 99024 POSTOP FOLLOW-UP VISIT: CPT | Performed by: STUDENT IN AN ORGANIZED HEALTH CARE EDUCATION/TRAINING PROGRAM

## 2023-11-30 NOTE — PROGRESS NOTES
Orthopedic Oncology Post Operative Office Note  Steven Nash (39 y.o. male)   : 1965   MRN: [id number]   Encounter Date: 2023  Dr. Ermias Manning DO, Orthopedic Surgeon  Orthopedic Oncology & Sarcoma Surgery   DOS: 10/11/2023  Procedure performed: Excision Biopsy Tissue Lesion/mass Knee - Right      Impression/Plan: 60 y.o. male  6 weeks s/p Excision Biopsy Tissue Lesion/mass Knee - Right  Final pathology consistent with nodular PVNS. S/p Excision of mass of posterior right knee    Wound Care: Incision is healing well, no restrictions on bathing. Pain control: PRN  Physical therapy: Will begin within the next week  Full weightbearing to right lower extremity  Complete DVT ppx  Work note provided stating he will remain out of work for another 3 weeks while he begins physical therapy. He can return to work as light duty on 23 with limitations until 24. It is my professional opinion that the patient injured his knee while at work and thus aggravated a lesion that may have been dormant within his knee for a long period of time. He likely had this lesion for very long time and his injury at work brought this to life and exacerbated its issues. Return in about 6 weeks (around 2024) for Recheck. for evaluation without x-ray    2. Right knee pain  Due to injury and history of degenerative changes, may have chronic knee pain following removal of lesion as discussed prior to surgery  Discussed the presence of osteoarthritis of the right knee as noted in the MRI report  Discussed with patient that arthritis is a chronic, incurable, irreversible disease and that management would be focused on alleviating symptoms, as it is not possible to reverse or remove the degenerative changes.  Discussed treatment options to include symptom masking with voltaren gel and OTC pain relievers, muscle strengthening with PT to have the joint rely on strong muscle rather than bad bone, and possible consideration of corticosteroid or visco supplementation injections in the future. Discussed definitive treatment as total joint replacement, which would be an elective, pain-relieving procedure, and that symptoms and radiographs may not align, leaving it to the patient to determine when the symptoms would warrant the surgery. Discussed with patient the potential for CS injection in the future, once patient has completed several sessions of physical therapy       Subjective:  60 y.o. male 6 weeks s/p Excision Biopsy Tissue Lesion/mass Knee - Right  DOS: Excision Biopsy Tissue Lesion/mass Knee - Right     Pain/Complaints: None at this time, does not require any pain medication at this time. Numbness/weakness extremity: None    Physical Therapy Progress: To begin within the next week   DVT ppx: ASA 81mg BID x 2 weeks   Abx: N/A   Eating/Drinking improving. Bowel/Bladder: WNL   Denies fever/chills, numbness/tingling, injury/trauma, night sweats    Patient reports that he is experiencing MJL and LJL pain with anterior knee pain. He states that the posterior knee is doing great. He reports that he is completing daily walks around his neighborhood up to a mile a day. Physical Exam:  Height: 6' 3" (190.5 cm)  Weight - Scale: (!) 141 kg (311 lb)  BMI (Calculated): 38.9  BSA (Calculated - m2): 2.65 sq meters     Vitals:    11/30/23 0854   BP: 132/89   Pulse: 94     Body mass index is 38.87 kg/m². General: alert and oriented x 3; well nourished/well developed; no apparent distress. Drains:  No drain    Extremity: Right knee -   No swelling throughout  Surgical site:  clean, dry, and intact. Healing well. Sensation: intact   Motor: EHL/FHL/DF/PF  Brisk cap refill  Calf: bilateral calves soft, nontender    Labs:   Lab Results   Component Value Date    WBC 6.85 10/12/2023    HGB 14.3 10/12/2023    HCT 42.8 10/12/2023    MCV 84 10/12/2023     10/12/2023       Pathology: FINAL  Final Diagnosis   A.  Soft tissue, "Right knee mass," Resection:  - Tenosynovial giant cell tumor, localized-type     Microbiology:  None    Imaging:   No new imaging obtained today    Oncology History    No history exists.        Scribe Attestation    I,:  Winsome Alvarez am acting as a scribe while in the presence of the attending physician.:       I,:  Lan Cruz DO personally performed the services described in this documentation    as scribed in my presence.:          Dr. Maryam Alvarez DO, Orthopedic Surgeon  Orthopedic Oncology & Sarcoma Surgery   Phone:[phone] TID:[phone]

## 2023-11-30 NOTE — LETTER
November 30, 2023     Patient: Dung Wills  YOB: 1965  Date of Visit: 11/30/2023      To Whom it May Concern:    Dung Wills is under my professional care. Bing Alvarado was seen in my office on 11/30/2023. Bing Alvarado may return to work with limitations 12/20/23 . He will be completing physical therapy for two weeks prior to return to work on 12/20/23. He is able to complete work related activities as tolerated on 12/20/23, with light duty limitations until 1/1/23. If you have any questions or concerns, please don't hesitate to call.          Sincerely,          Dale Bowie DO        CC: No Recipients

## 2023-11-30 NOTE — TELEPHONE ENCOUNTER
Caller: Patient     Doctor: Bong     Reason for call: Letter for work has to state patients limitations that are required if patient can  a certain amount of pounds or is able to drive trucks at work and until return to work date should state 1/11/2024  Please advise     Call back#: [phone]

## 2023-12-06 ENCOUNTER — TELEPHONE (OUTPATIENT)
Dept: OBGYN CLINIC | Facility: HOSPITAL | Age: 58
End: 2023-12-06

## 2023-12-06 NOTE — TELEPHONE ENCOUNTER
Caller: Amanda Kline / [employer]     Doctor: Netta WALKER     Reason for call: Amanda Kline called regarding work status letter of 11/30. Please update to include what the limitations / restrictions are for patient.     Please fax to attn:   Claim # [id number]    Call back#: [phone]   Fax # [phone]

## 2023-12-08 ENCOUNTER — EVALUATION (OUTPATIENT)
Dept: PHYSICAL THERAPY | Facility: MEDICAL CENTER | Age: 58
End: 2023-12-08
Payer: OTHER MISCELLANEOUS

## 2023-12-08 DIAGNOSIS — M23.91 INTERNAL DERANGEMENT OF KNEE JOINT, RIGHT: Primary | ICD-10-CM

## 2023-12-08 DIAGNOSIS — Z47.89 ORTHOPEDIC AFTERCARE: ICD-10-CM

## 2023-12-08 PROCEDURE — 97161 PT EVAL LOW COMPLEX 20 MIN: CPT | Performed by: PHYSICAL THERAPIST

## 2023-12-08 PROCEDURE — 97110 THERAPEUTIC EXERCISES: CPT | Performed by: PHYSICAL THERAPIST

## 2023-12-08 NOTE — PROGRESS NOTES
PT Evaluation     Today's date: 2023  Patient name: Philomena Storey  : 1965  MRN: [id number]  Referring provider: Souleymane Gonzalez DO  Dx:   Encounter Diagnosis     ICD-10-CM    1. Internal derangement of knee joint, right  M23.91 Ambulatory Referral to Physical Therapy      2. Orthopedic aftercare  Z47.89                      Assessment  Assessment details: Mr. Harpreet Hastings is a pleasant 60 y.o. male who presents to physical therapy roughly 2 months s/p Excision of mass of posterior right knee. No further referral appears necessary at this time based upon examination findings. Patient presents with decreased right knee ROM and strength with associated proximal and core weakness resulting in adverse joint stress at the right knee limiting squatting, bending, and stair climbing. Patient also has mild scar adhesions at the medial aspect of the incision. Patient would benefit from outpatient physical therapy services to address the observed impairments to restore functional mobility and facilitate RTW. Prognosis is good given compliance with PT attendance and HEP performance. Please contact me any questions. Thank you for the referral.   Impairments: abnormal or restricted ROM, activity intolerance, impaired physical strength, lacks appropriate home exercise program, pain with function and poor body mechanics  Barriers to therapy: BMI, underlying arthropathy,   Understanding of Dx/Px/POC: good   Prognosis: good    Goals  1. Patient will be independent in individualized HEP. 2. Patient will improve right knee AROM 0-125 degrees  3. Patient will improve right knee strength to 5/5  4. Patient will improve proximal strength to 5/5  5. Patient demonstrate appropriate mechanics with functional squat. 6. Patient will be able to negotiate stairs with reciprocal pattern. 7. Patient will achieve score on FOTO by [facility].     Plan  Patient would benefit from: skilled physical therapy  Planned therapy interventions: manual therapy, patient education, strengthening, stretching, therapeutic activities, therapeutic exercise, home exercise program, graded activity, graded exercise, functional ROM exercises, body mechanics training and abdominal trunk stabilization  Frequency: 2x week  Duration in weeks: 6  Plan of Care beginning date: 12/8/2023  Plan of Care expiration date: 1/19/2024  Treatment plan discussed with: patient        Subjective Evaluation    History of Present Illness  Date of surgery: 10/6/2023  Mechanism of injury: surgery  Mechanism of injury: Mr. Albert Aguilar is a 60 y.o. male who presents today 2 months s/p Excision of mass of posterior right knee. Patient previously was seen in physical therapy after a right knee injury at work that occurred in June 2023. Despite conservative management, patient continued to experience pain. Upon further evaluation a mass/lesion of the right knee was noted and suspected to have been aggravated by work injury. Patient elected for surgical intervention. Surgery was performed 10/6/23 without any complications. Patient reports near immediate relief after surgery. Patient has his most recent follow up with ortho 2 weeks ago he was recommended braces for work and was referred to PT for ROM and strengthening. Patient is most restricted with bending, squatting, and stair climbing. He states he is currently negotiating stairs laterally. He is planning to return to work 12/20/23 for light duty. Next follow up with Dr. Laverne Chavarria is 1/11/24. Patient denies any numbness/tingling. Patient states his pain is well managed and he is feeling better each day. Patient reports no concerns at this time. His goals are to restore mobility and strength to be able to return to work and hobbies, including hunting and fishing.   Patient Goals  Patient goals for therapy: increased motion, increased strength, return to sport/leisure activities and return to work  Patient's goals regarding treatment: hunting, fishing. Pain  Current pain ratin  At best pain ratin  At worst pain rating: 3  Pain location: right medial and lateral joint lines. Quality: sharp  Alleviating factors: ice prn, ibuprofen prn. Exacerbated by: bending, squatting, stairs, prolonged activity. Progression: improved    Social Support    Employment status: not working ([employer]  (returns to work light duty ))  Treatments  Previous treatment: physical therapy        Objective     Observations     Additional Observation Details  Incision along posterior knee joint line and medially, well healed    STS: difficulty with standing without use of hands, fair eccentric lowering    Functional squat: significant trunk flexion, excessive anterior tibial translation, mild pain medial joint line on right knee    Palpation     Additional Palpation Details  Mild tenderness along most medial aspect of scar with mild adhesions noted    Tenderness     Right Knee   Tenderness in the lateral joint line and medial joint line.      Active Range of Motion   Left Knee   Normal active range of motion    Right Knee   Flexion: 115 degrees   Extension: 5 degrees     Passive Range of Motion     Right Knee   Flexion: 125 degrees   Extension: 0 degrees     Additional Passive Range of Motion Details  Significant hamstring tightness bilaterally  Moderate rectus femoris tightness bilaterally  Mild gastroc tightness on right    Mobility   Patellar Mobility:     Right Knee   WFL: medial, lateral, superior and inferior    Strength/Myotome Testing     Left Knee   Normal strength    Right Knee   Flexion: 4  Prone flexion: 4  Extension: 4  Quadriceps contraction: good    Additional Strength Details  Hip strength  Flexion L = 5/5, R = 4/5  Abduction L = 4/5, R = 4-/5  Adduction L = 4/5, R = 3-/5    Ankle strength  5/5 DF strength bilaterally  PF L = 5/5, R = 4/5 (decreased heel clearance)      Flowsheet Rows      Flowsheet Row Most Recent Value   PT/OT G-Codes Current Score 61   Projected Score 70   FOTO information reviewed Yes   Assessment Type Evaluation               Precautions: HTN (medically controlled), hx of osgood schlotter's   DOS: 10/6/23- S/p Excision of mass of posterior right knee    MD orders:   Evaluate and Treat for osteoarthritis in the right knee  Please provide program for ROM and strengthening with use of modalities as necessary     HEP: quad set, hamstring stretch.  Calf stretch, SLR 4-way  Manuals 12/8            Scar massage x5'                                                   Neuro Re-Ed                                                                                                        Ther Ex             bike nv            SLR 4 way X10 ea            Hamstring stretch long sit 20"x4            Calf stretch long sit 20"x4            Prone quad stretch nv            Bridge with ball nv            Standing hip abduction  nv            core                                                                 Ther Activity             Step up forward nv            Step up lateral nv            Forward step down             Lateral step down             Gait Training                                       Modalities

## 2023-12-13 NOTE — TELEPHONE ENCOUNTER
Caller: [employer]    Doctor: Danielle Smith    Reason for call: Requested status of previously requested work letter? Please update work note to include limitations/restrictions.  Fax to [phone] [facility]#[id number]    Call back#: [phone]

## 2023-12-19 ENCOUNTER — OFFICE VISIT (OUTPATIENT)
Dept: PHYSICAL THERAPY | Facility: MEDICAL CENTER | Age: 58
End: 2023-12-19
Payer: OTHER MISCELLANEOUS

## 2023-12-19 DIAGNOSIS — M23.91 INTERNAL DERANGEMENT OF KNEE JOINT, RIGHT: Primary | ICD-10-CM

## 2023-12-19 DIAGNOSIS — Z47.89 ORTHOPEDIC AFTERCARE: ICD-10-CM

## 2023-12-19 PROCEDURE — 97110 THERAPEUTIC EXERCISES: CPT | Performed by: PHYSICAL THERAPIST

## 2023-12-19 NOTE — PROGRESS NOTES
"Daily Note     Today's date: 2023  Patient name: Howard Echeverria  : 1965  MRN: [id number]  Referring provider: Darren Woody DO  Dx:   Encounter Diagnosis     ICD-10-CM    1. Internal derangement of knee joint, right  M23.91       2. Orthopedic aftercare  Z47.89                      Subjective: Patient states he had one episode where he was doing a lot [place] walking [place] the mall and later that night his leg swelled significantly. He states he used compression garments and the swelling resolved and has not had an issue since. Reports compliance with HEP and scar massage.       Objective: See treatment diary below      Assessment: Posterior knee incision smoothing and non-adhered. Demonstrates improved quad control. Fatigues easily with proximal hip strengthening. Able to progress step ups with focus on right LE predominately working on the ascent and descent. Challenged with balance activity. Patient had no reports of symptom provocation or discomfort during or post treatment session but did report muscular fatigue. Encouraged patient to continue with HEP and scar massage. Will progress next visit as able.       Plan: Continue per plan of care.  Progress treatment as tolerated.       Precautions: HTN (medically controlled), hx of osgood schlotter's   DOS: 10/6/23- S/p Excision of mass of posterior right knee    MD orders:   Evaluate and Treat for osteoarthritis in the right knee  Please provide program for ROM and strengthening with use of modalities as necessary     HEP: quad set, hamstring stretch. Calf stretch, SLR 4-way  Manuals            Scar massage x5' x5'                                                  Neuro Re-Ed             Balance ABC's  Tandem x2                                                                                         Ther Ex             bike nv x10'           SLR 4 way X10 ea 3x10 ea           Hamstring stretch long sit 20\"x4 20\"x4           Calf stretch long sit " "20\"x4 Incline board 20\"x4           Prone quad stretch nv 20\"x4           Bridge with ball nv 5\"x30           Standing hip abduction  nv x30           Dead bugs  nv                                                               Ther Activity             Step up forward nv L2 x20           Step up lateral nv L2 x20           Forward step down  nv           Lateral step down  nv           Gait Training                                       Modalities                                            "

## 2023-12-22 ENCOUNTER — OFFICE VISIT (OUTPATIENT)
Dept: PHYSICAL THERAPY | Facility: MEDICAL CENTER | Age: 58
End: 2023-12-22
Payer: OTHER MISCELLANEOUS

## 2023-12-22 DIAGNOSIS — M23.91 INTERNAL DERANGEMENT OF KNEE JOINT, RIGHT: Primary | ICD-10-CM

## 2023-12-22 DIAGNOSIS — Z47.89 ORTHOPEDIC AFTERCARE: ICD-10-CM

## 2023-12-22 PROCEDURE — 97110 THERAPEUTIC EXERCISES: CPT | Performed by: PHYSICAL THERAPIST

## 2023-12-22 NOTE — PROGRESS NOTES
"Daily Note     Today's date: 2023  Patient name: Howard Echeverria  : 1965  MRN: [id number]  Referring provider: Darren Woody DO  Dx:   Encounter Diagnosis     ICD-10-CM    1. Internal derangement of knee joint, right  M23.91       2. Orthopedic aftercare  Z47.89                      Subjective: Patient states he is returning to work fully duty on 24.       Objective: See treatment diary below      Assessment: Patient is progressing appropriately s/p right posterior knee mass excision DOS 10/6/23. Patient has good quad control and full right knee ROM. Progressed posterior chain strengthening with most challenge with isolated hamstring strength. Also progressed CKC eccentric strengthening with good tolerance. He did fatigue with step down exercises but overall felt strong and demonstrated good mechanics. Patient would benefit from continued PT for graded strengthening to prepare return to work at the New Year.       Plan: Continue per plan of care.  Progress treatment as tolerated.       Precautions: HTN (medically controlled), hx of osgood malater's   DOS: 10/6/23- S/p Excision of mass of posterior right knee    MD orders:   Evaluate and Treat for osteoarthritis in the right knee  Please provide program for ROM and strengthening with use of modalities as necessary     HEP: quad set, hamstring stretch. Calf stretch, SLR 4-way  Manuals           Scar massage x5' x5' X5'                                                 Neuro Re-Ed             Balance ABC's  Tandem x2 Foam 20\"x3                                                                                        Ther Ex             bike nv x10' x10'          SLR 4 way X10 ea 3x10 ea 1# 3x10 ea          Hamstring stretch long sit 20\"x4 20\"x4 20\"x4          Calf stretch long sit 20\"x4 Incline board 20\"x4 Incline board 20\"x4          Prone quad stretch nv 20\"x4 20\"x4          Bridge with ball nv 5\"x30 5\"x30          SL bridge   5\" x10 " ea          Standing hip abduction  nv x30 Foam x30 ea          Pball hamstring curls   3x10                                                              Ther Activity             Step up forward nv L2 x20 L3 x30          Step up lateral nv L2 x20 L3 x30          Forward step down  nv L1 x20          Lateral step down  nv L1 x20          Gait Training                                       Modalities

## 2023-12-26 ENCOUNTER — OFFICE VISIT (OUTPATIENT)
Dept: PHYSICAL THERAPY | Facility: MEDICAL CENTER | Age: 58
End: 2023-12-26
Payer: OTHER MISCELLANEOUS

## 2023-12-26 DIAGNOSIS — M23.91 INTERNAL DERANGEMENT OF KNEE JOINT, RIGHT: Primary | ICD-10-CM

## 2023-12-26 DIAGNOSIS — Z47.89 ORTHOPEDIC AFTERCARE: ICD-10-CM

## 2023-12-26 PROCEDURE — 97110 THERAPEUTIC EXERCISES: CPT | Performed by: PHYSICAL THERAPIST

## 2023-12-26 NOTE — PROGRESS NOTES
"Daily Note     Today's date: 2023  Patient name: Howard Echeverria  : 1965  MRN: [id number]  Referring provider: Darren Woody DO  Dx:   Encounter Diagnosis     ICD-10-CM    1. Internal derangement of knee joint, right  M23.91       2. Orthopedic aftercare  Z47.89                      Subjective: Patient states his right knee feels good. He is confident in being able to RTW at full duty.        Objective: See treatment diary below      Assessment: Patient demonstrates full right knee mobility and appropriate quad control. He is challenged with quad endurance with step downs and wall squats with fatigue reported in the right quad. Patient would benefit from one more visit for graded strengthening and to finalize HEP before returning to work full duty.       Plan: Potential DC with HEP at next visit.     Precautions: HTN (medically controlled), hx of osgood schlotter's   DOS: 10/6/23- S/p Excision of mass of posterior right knee    MD orders:   Evaluate and Treat for osteoarthritis in the right knee  Please provide program for ROM and strengthening with use of modalities as necessary     HEP: quad set, hamstring stretch. Calf stretch, SLR 4-way  Manuals          Scar massage x5' x5' X5' x5'                                                Neuro Re-Ed             Balance ABC's  Tandem x2 Foam 20\"x3 Foam 20\"x3                                                                                       Ther Ex             bike nv x10' x10' x10'         SLR 4 way X10 ea 3x10 ea 1# 3x10 ea 2# 3x10 ea         Hamstring stretch long sit 20\"x4 20\"x4 20\"x4 20\"x4         Calf stretch long sit 20\"x4 Incline board 20\"x4 Incline board 20\"x4 Incline board 20\"x4         Prone quad stretch nv 20\"x4 20\"x4 20\"x4         Bridge with ball nv 5\"x30 5\"x30 5\"x30         SL bridge   5\" x10 ea 5\"x10 ea         Standing hip abduction  nv x30 Foam x30 ea Foam x30 ea         Pball hamstring curls   3x10 3x10       "   Wall squats                                                    Ther Activity             Step up forward nv L2 x20 L3 x30 L3 x30         Step up lateral nv L2 x20 L3 x30 L3 x30         Forward step down  nv L1 x20 L2 x20         Lateral step down  nv L1 x20 L2 x20         Gait Training                                       Modalities

## 2023-12-29 ENCOUNTER — OFFICE VISIT (OUTPATIENT)
Dept: PHYSICAL THERAPY | Facility: MEDICAL CENTER | Age: 58
End: 2023-12-29
Payer: OTHER MISCELLANEOUS

## 2023-12-29 DIAGNOSIS — Z47.89 ORTHOPEDIC AFTERCARE: ICD-10-CM

## 2023-12-29 DIAGNOSIS — M23.91 INTERNAL DERANGEMENT OF KNEE JOINT, RIGHT: Primary | ICD-10-CM

## 2023-12-29 PROCEDURE — 97110 THERAPEUTIC EXERCISES: CPT | Performed by: PHYSICAL THERAPIST

## 2023-12-29 NOTE — PROGRESS NOTES
"Daily Note     Today's date: 2023  Patient name: Howard Echeverria  : 1965  MRN: [id number]  Referring provider: Darren Woody DO  Dx:   Encounter Diagnosis     ICD-10-CM    1. Internal derangement of knee joint, right  M23.91       2. Orthopedic aftercare  Z47.89                      Subjective: Patient offers no new updates at this time. Plans to return to work full duty next week.      Objective: See treatment diary below      Assessment: Patient has been compliant with PT attendance and HEP performance. He has regained full right knee ROM and strength. He demonstrates good quad control with functional strengthening. He is planning to return to work full duty next week and would like to discontinue PT at this time. He may return as needed.       Plan: DC with HEP for continued strengthening.      Precautions: HTN (medically controlled), hx of osgoodgerard elmore's   DOS: 10/6/23- S/p Excision of mass of posterior right knee    MD orders:   Evaluate and Treat for osteoarthritis in the right knee  Please provide program for ROM and strengthening with use of modalities as necessary     HEP: quad set, hamstring stretch. Calf stretch, SLR 4-way  Manuals         Scar massage x5' x5' X5' x5' x5'                                               Neuro Re-Ed             Balance ABC's  Tandem x2 Foam 20\"x3 Foam 20\"x3 Foam ABC's x3                                                                                      Ther Ex             bike nv x10' x10' x10' x10'        SLR 4 way X10 ea 3x10 ea 1# 3x10 ea 2# 3x10 ea 3# 3x10 ea        Hamstring stretch long sit 20\"x4 20\"x4 20\"x4 20\"x4 20\"x4        Calf stretch long sit 20\"x4 Incline board 20\"x4 Incline board 20\"x4 Incline board 20\"x4 Incline board 20\"x4        Prone quad stretch nv 20\"x4 20\"x4 20\"x4 20\"x4        Bridge with ball nv 5\"x30 5\"x30 5\"x30 5\"x30        SL bridge   5\" x10 ea 5\"x10 ea 5\"x10 ea        Standing hip abduction  nv x30 " Foam x30 ea Foam x30 ea Foam x30 ea        Pball hamstring curls   3x10 3x10 3x10        Wall squats     3x10                                               Ther Activity             Step up forward nv L2 x20 L3 x30 L3 x30 L3 x30        Step up lateral nv L2 x20 L3 x30 L3 x30 L3 x30        Forward step down  nv L1 x20 L2 x20 L2 x20        Lateral step down  nv L1 x20 L2 x20 L2 x20        Gait Training                                       Modalities

## 2024-01-11 ENCOUNTER — OFFICE VISIT (OUTPATIENT)
Dept: OBGYN CLINIC | Facility: MEDICAL CENTER | Age: 59
End: 2024-01-11
Payer: OTHER MISCELLANEOUS

## 2024-01-11 VITALS
WEIGHT: 315 LBS | BODY MASS INDEX: 39.17 KG/M2 | HEIGHT: 75 IN | HEART RATE: 90 BPM | SYSTOLIC BLOOD PRESSURE: 141 MMHG | DIASTOLIC BLOOD PRESSURE: 88 MMHG

## 2024-01-11 DIAGNOSIS — D48.19 TENOSYNOVIAL GIANT CELL TUMOR OF KNEE: Primary | ICD-10-CM

## 2024-01-11 PROCEDURE — 99213 OFFICE O/P EST LOW 20 MIN: CPT | Performed by: STUDENT IN AN ORGANIZED HEALTH CARE EDUCATION/TRAINING PROGRAM

## 2024-01-11 NOTE — PROGRESS NOTES
"Orthopedic Oncology Post Operative Office Note  Howard Echeverria (58 y.o. male)   : 1965   MRN: [id number]   Encounter Date: 2024  Dr. Darren Woody, DO, Orthopedic Surgeon  Orthopedic Oncology [facility]   DOS: 10/11/2023  Procedure performed: Excision Biopsy Tissue Lesion/mass Knee - Right      Impression/Plan: 58 y.o. male  3 months s/p Excision Biopsy Tissue Lesion/mass Knee - Right  Final pathology consistent with nodular PVNS.     S/p Excision of mass of posterior right knee    Wound Care: Incision is healing well  Continue vitamin E oil and massage; continue to readjust pressure on discoloration where ACE wrap was   Pain control: PRN  Physical therapy: HEP going well  Full weightbearing to right lower extremity  Returned to work without concern; work note provided for full duty     Return in about 3 months (around 2024).  for evaluation without x-ray    2. Nodular PVNS  Per protocol, PRN follow up for 5 years postop ()    3. Right knee pain  Continue current activity, treatment, and HEP       Subjective:  58 y.o. male 3 months s/p Excision Biopsy Tissue Lesion/mass Knee - Right  DOS: Excision Biopsy Tissue Lesion/mass Knee - Right    Massage gun helping   Had only one episode of swelling with juan shopping, since resolved     Pain/Complaints: None at this time, does not require any pain medication at this time.   Numbness/weakness extremity: None    Physical Therapy Progress: To begin within the next week   DVT ppx: ASA 81mg BID x 2 weeks   Abx: N/A   Eating/Drinking improving. Bowel/Bladder: WNL   Denies fever/chills, numbness/tingling, injury/trauma, night sweats        Physical Exam:  Height: 6' 3\" (190.5 cm)  Weight - Scale: (!) 147 kg (323 lb)  BMI (Calculated): 40.4  BSA (Calculated - m2): 2.69 sq meters     Vitals:    24 0854   BP: 141/88   Pulse: 90     Body mass index is 40.37 kg/m².    General: alert and oriented x 3; well nourished/well developed; no " "apparent distress.    Drains:  No drain    Extremity: Right knee -   No swelling throughout  Surgical site:  clean, dry, and intact. Healing well. Minimal scarring/discoloration in posterior knee  Sensation: intact   Motor: EHL/FHL/DF/PF  Brisk cap refill  Calf: bilateral calves soft, nontender    Labs:   Lab Results   Component Value Date    WBC 6.85 10/12/2023    HGB 14.3 10/12/2023    HCT 42.8 10/12/2023    MCV 84 10/12/2023     10/12/2023       Pathology: FINAL  Final Diagnosis   A. Soft tissue, \"Right knee mass,\" Resection:  - Tenosynovial giant cell tumor, localized-type     Microbiology:  None    Imaging:   No new imaging obtained today    IIsak PA-C, scribed this note in conjunction with and in the presence of Dr. Darren Woody DO, who performed parts of the services as described in this documentation    Dr. Darren Woody DO, Orthopedic Surgeon  [facility] Oncology [facility]   Phone:[phone] Fax:[phone]      "

## 2024-01-11 NOTE — LETTER
January 11, 2024     Patient: Howard Echeverria  YOB: 1965  Date of Visit: 1/11/2024      To Whom it May Concern:    Howard Echeverria is under my professional care. Howard was seen in my office on 1/11/2024. Howard may return to work without limitations or restrictions 1/11/24.    If you have any questions or concerns, please don't hesitate to call.         Sincerely,          Darren Woody, DO        CC: No Recipients

## 2024-01-12 ENCOUNTER — TELEPHONE (OUTPATIENT)
Age: 59
End: 2024-01-12

## 2024-01-12 NOTE — TELEPHONE ENCOUNTER
Caller: EDDIE Adhikari work comp    Doctor/Office: Bong / Raghu     #: [phone]    What needs to be faxed: Last Office note     ATTN to: EDDIE     Fax#: [phone]      Documents were successfully e-faxed

## 2025-08-06 ENCOUNTER — OFFICE VISIT (OUTPATIENT)
Dept: URGENT CARE | Age: 60
End: 2025-08-06
Payer: COMMERCIAL

## 2025-08-06 VITALS
DIASTOLIC BLOOD PRESSURE: 63 MMHG | OXYGEN SATURATION: 100 % | HEART RATE: 89 BPM | RESPIRATION RATE: 18 BRPM | TEMPERATURE: 98.3 F | SYSTOLIC BLOOD PRESSURE: 126 MMHG

## 2025-08-06 DIAGNOSIS — R59.1 LYMPHADENOPATHY: Primary | ICD-10-CM

## 2025-08-06 DIAGNOSIS — H69.91 DISORDER OF RIGHT EUSTACHIAN TUBE: ICD-10-CM

## 2025-08-06 PROCEDURE — 99213 OFFICE O/P EST LOW 20 MIN: CPT | Performed by: PHYSICIAN ASSISTANT

## (undated) DEVICE — LIGACLIP MCA MULTIPLE CLIP APPLIERS, 20 SMALL CLIPS: Brand: LIGACLIP

## (undated) DEVICE — PADDING CAST 3IN COTTON STRL

## (undated) DEVICE — ABDOMINAL PAD: Brand: DERMACEA

## (undated) DEVICE — SUT MONOCRYL 2-0 CT-1 36 IN Y945H

## (undated) DEVICE — 10FR FRAZIER SUCTION HANDLE: Brand: CARDINAL HEALTH

## (undated) DEVICE — SUT MONOCRYL 3-0 PS-2 27 IN Y427H

## (undated) DEVICE — DRAPE SHEET THREE QUARTER

## (undated) DEVICE — SYRINGE 20ML LL

## (undated) DEVICE — 3M™ STERI-DRAPE™ U-DRAPE 1015: Brand: STERI-DRAPE™

## (undated) DEVICE — DRESSING MEPILEX AG BORDER POST-OP 4 X 6 IN

## (undated) DEVICE — SUT VICRYL 2-0 CT-2 27 IN J269H

## (undated) DEVICE — PAD CAST 3 IN COTTON NON STRL

## (undated) DEVICE — DRAPE C-ARM X-RAY

## (undated) DEVICE — TELFA NON-ADHERENT ABSORBENT DRESSING: Brand: TELFA

## (undated) DEVICE — GLOVE SRG BIOGEL 6.5

## (undated) DEVICE — ACE WRAP 6 IN UNSTERILE

## (undated) DEVICE — BRACE-LONG KNEE TROM 66CM

## (undated) DEVICE — INTENDED FOR TISSUE SEPARATION, AND OTHER PROCEDURES THAT REQUIRE A SHARP SURGICAL BLADE TO PUNCTURE OR CUT.: Brand: BARD-PARKER ® CARBON RIB-BACK BLADES

## (undated) DEVICE — PADDING CAST 6IN COTTON STRL

## (undated) DEVICE — ACE WRAP 3 IN UNSTERILE

## (undated) DEVICE — GLOVE INDICATOR PI UNDERGLOVE SZ 7 BLUE

## (undated) DEVICE — LIGACLIP MCA MULTIPLE CLIP APPLIERS, 20 MEDIUM CLIPS: Brand: LIGACLIP

## (undated) DEVICE — GLOVE SRG BIOGEL 7.5

## (undated) DEVICE — GLOVE SRG BIOGEL 8

## (undated) DEVICE — SPECIMEN CONTAINER STERILE PEEL PACK

## (undated) DEVICE — BETHLEHEM TOTAL HIP, KIT: Brand: CARDINAL HEALTH

## (undated) DEVICE — DRESSING MEPILEX BORDER 4 X 10 IN

## (undated) DEVICE — SUT VICRYL 1 CT-1 27 IN J261H

## (undated) DEVICE — WEBRIL 6 IN UNSTERILE

## (undated) DEVICE — COBAN 6 IN STERILE

## (undated) DEVICE — SCD SEQUENTIAL COMPRESSION COMFORT SLEEVE MEDIUM KNEE LENGTH: Brand: KENDALL SCD

## (undated) DEVICE — SUT ETHILON 3-0 PS-1 18 IN 1663G

## (undated) DEVICE — 3000CC GUARDIAN II: Brand: GUARDIAN

## (undated) DEVICE — PENCIL ELECTROSURG E-Z CLEAN -0035H

## (undated) DEVICE — 3M™ IOBAN™ 2 ANTIMICROBIAL INCISE DRAPE 6648EZ: Brand: IOBAN™ 2

## (undated) DEVICE — IMPERVIOUS STOCKINETTE: Brand: DEROYAL

## (undated) DEVICE — NEEDLE HYPO 22G X 1-1/2 IN